# Patient Record
Sex: FEMALE | Race: WHITE | Employment: UNEMPLOYED | URBAN - NONMETROPOLITAN AREA
[De-identification: names, ages, dates, MRNs, and addresses within clinical notes are randomized per-mention and may not be internally consistent; named-entity substitution may affect disease eponyms.]

---

## 2017-02-23 ENCOUNTER — OFFICE VISIT (OUTPATIENT)
Dept: FAMILY MEDICINE CLINIC | Age: 57
End: 2017-02-23
Payer: COMMERCIAL

## 2017-02-23 VITALS
WEIGHT: 245 LBS | BODY MASS INDEX: 46.26 KG/M2 | OXYGEN SATURATION: 95 % | RESPIRATION RATE: 16 BRPM | TEMPERATURE: 99.1 F | DIASTOLIC BLOOD PRESSURE: 68 MMHG | SYSTOLIC BLOOD PRESSURE: 112 MMHG | HEIGHT: 61 IN | HEART RATE: 91 BPM

## 2017-02-23 DIAGNOSIS — L02.511 ABSCESS OF FINGER, RIGHT: Primary | ICD-10-CM

## 2017-02-23 DIAGNOSIS — L02.511 ABSCESS OF FINGER, RIGHT: ICD-10-CM

## 2017-02-23 PROCEDURE — 99213 OFFICE O/P EST LOW 20 MIN: CPT | Performed by: NURSE PRACTITIONER

## 2017-02-23 RX ORDER — SULFAMETHOXAZOLE AND TRIMETHOPRIM 800; 160 MG/1; MG/1
1 TABLET ORAL 2 TIMES DAILY
Qty: 20 TABLET | Refills: 0 | Status: SHIPPED | OUTPATIENT
Start: 2017-02-23 | End: 2017-03-05

## 2017-02-27 LAB
ANAEROBIC CULTURE: NORMAL
GRAM STAIN RESULT: NORMAL
WOUND/ABSCESS: NORMAL

## 2017-05-10 ENCOUNTER — OFFICE VISIT (OUTPATIENT)
Dept: FAMILY MEDICINE CLINIC | Age: 57
End: 2017-05-10
Payer: COMMERCIAL

## 2017-05-10 VITALS
RESPIRATION RATE: 18 BRPM | WEIGHT: 246 LBS | DIASTOLIC BLOOD PRESSURE: 64 MMHG | HEIGHT: 61 IN | OXYGEN SATURATION: 96 % | HEART RATE: 78 BPM | BODY MASS INDEX: 46.44 KG/M2 | SYSTOLIC BLOOD PRESSURE: 120 MMHG | TEMPERATURE: 99 F

## 2017-05-10 DIAGNOSIS — K21.9 GASTROESOPHAGEAL REFLUX DISEASE WITHOUT ESOPHAGITIS: Primary | ICD-10-CM

## 2017-05-10 DIAGNOSIS — M79.10 MYALGIA: ICD-10-CM

## 2017-05-10 DIAGNOSIS — Z23 NEED FOR PROPHYLACTIC VACCINATION AGAINST STREPTOCOCCUS PNEUMONIAE (PNEUMOCOCCUS): ICD-10-CM

## 2017-05-10 DIAGNOSIS — Z13.220 SCREENING, LIPID: ICD-10-CM

## 2017-05-10 DIAGNOSIS — R06.2 WHEEZING: ICD-10-CM

## 2017-05-10 DIAGNOSIS — Z12.31 SCREENING MAMMOGRAM, ENCOUNTER FOR: ICD-10-CM

## 2017-05-10 LAB
ALBUMIN SERPL-MCNC: 3.9 G/DL (ref 3.5–5.2)
ALP BLD-CCNC: 97 U/L (ref 35–104)
ALT SERPL-CCNC: 30 U/L (ref 5–33)
ANION GAP SERPL CALCULATED.3IONS-SCNC: 16 MMOL/L (ref 7–19)
AST SERPL-CCNC: 25 U/L (ref 5–32)
BACTERIA: NEGATIVE /HPF
BASOPHILS ABSOLUTE: 0.1 K/UL (ref 0–0.2)
BASOPHILS RELATIVE PERCENT: 1.3 % (ref 0–1)
BILIRUB SERPL-MCNC: <0.2 MG/DL (ref 0.2–1.2)
BILIRUBIN URINE: ABNORMAL
BLOOD, URINE: ABNORMAL
BUN BLDV-MCNC: 14 MG/DL (ref 6–20)
CALCIUM SERPL-MCNC: 9.3 MG/DL (ref 8.6–10)
CHLORIDE BLD-SCNC: 103 MMOL/L (ref 98–111)
CLARITY: ABNORMAL
CO2: 24 MMOL/L (ref 22–29)
COLOR: ABNORMAL
CREAT SERPL-MCNC: 0.8 MG/DL (ref 0.5–0.9)
EOSINOPHILS ABSOLUTE: 0.3 K/UL (ref 0–0.6)
EOSINOPHILS RELATIVE PERCENT: 3.5 % (ref 0–5)
EPITHELIAL CELLS, UA: 7 /HPF (ref 0–5)
GFR NON-AFRICAN AMERICAN: >60
GLOBULIN: 2.9 G/DL
GLUCOSE BLD-MCNC: 113 MG/DL (ref 74–109)
GLUCOSE URINE: NEGATIVE MG/DL
HCT VFR BLD CALC: 47.9 % (ref 37–47)
HEMOGLOBIN: 15.8 G/DL (ref 12–16)
HYALINE CASTS: 6 /HPF (ref 0–8)
KETONES, URINE: ABNORMAL MG/DL
LEUKOCYTE ESTERASE, URINE: ABNORMAL
LYMPHOCYTES ABSOLUTE: 2.1 K/UL (ref 1.1–4.5)
LYMPHOCYTES RELATIVE PERCENT: 26.7 % (ref 20–40)
MCH RBC QN AUTO: 30.3 PG (ref 27–31)
MCHC RBC AUTO-ENTMCNC: 33 G/DL (ref 33–37)
MCV RBC AUTO: 91.9 FL (ref 81–99)
MONOCYTES ABSOLUTE: 0.5 K/UL (ref 0–0.9)
MONOCYTES RELATIVE PERCENT: 6.9 % (ref 0–10)
NEUTROPHILS ABSOLUTE: 4.7 K/UL (ref 1.5–7.5)
NEUTROPHILS RELATIVE PERCENT: 61.1 % (ref 50–65)
NITRITE, URINE: NEGATIVE
PDW BLD-RTO: 12.3 % (ref 11.5–14.5)
PH UA: 5.5
PLATELET # BLD: 311 K/UL (ref 130–400)
PMV BLD AUTO: 11.3 FL (ref 7.4–10.4)
POTASSIUM SERPL-SCNC: 4.5 MMOL/L (ref 3.5–5)
PROTEIN UA: NEGATIVE MG/DL
RBC # BLD: 5.21 M/UL (ref 4.2–5.4)
RBC UA: 9 /HPF (ref 0–4)
SEDIMENTATION RATE, ERYTHROCYTE: 13 MM/HR (ref 0–25)
SODIUM BLD-SCNC: 143 MMOL/L (ref 136–145)
SPECIFIC GRAVITY UA: 1.04
TOTAL CK: 38 U/L (ref 26–192)
TOTAL PROTEIN: 6.8 G/DL (ref 6.6–8.7)
UROBILINOGEN, URINE: 0.2 E.U./DL
VITAMIN D 25-HYDROXY: 42 NG/ML
WBC # BLD: 7.7 K/UL (ref 4.8–10.8)
WBC UA: 25 /HPF (ref 0–5)

## 2017-05-10 PROCEDURE — 99214 OFFICE O/P EST MOD 30 MIN: CPT | Performed by: NURSE PRACTITIONER

## 2017-05-10 PROCEDURE — 90732 PPSV23 VACC 2 YRS+ SUBQ/IM: CPT | Performed by: NURSE PRACTITIONER

## 2017-05-10 PROCEDURE — 90471 IMMUNIZATION ADMIN: CPT | Performed by: NURSE PRACTITIONER

## 2017-05-10 RX ORDER — ALBUTEROL SULFATE 90 UG/1
2 AEROSOL, METERED RESPIRATORY (INHALATION) EVERY 6 HOURS PRN
Qty: 1 INHALER | Refills: 2 | Status: SHIPPED | OUTPATIENT
Start: 2017-05-10 | End: 2019-02-14 | Stop reason: SDUPTHER

## 2017-05-10 RX ORDER — OMEPRAZOLE 20 MG/1
20 CAPSULE, DELAYED RELEASE ORAL DAILY
Qty: 30 CAPSULE | Refills: 3 | Status: CANCELLED | OUTPATIENT
Start: 2017-05-10

## 2017-05-10 RX ORDER — ESOMEPRAZOLE MAGNESIUM 40 MG/1
40 CAPSULE, DELAYED RELEASE ORAL
Qty: 30 CAPSULE | Refills: 5 | Status: SHIPPED | OUTPATIENT
Start: 2017-05-10 | End: 2017-09-11 | Stop reason: SDUPTHER

## 2017-05-10 ASSESSMENT — ENCOUNTER SYMPTOMS
EYE PAIN: 0
PHOTOPHOBIA: 0
SHORTNESS OF BREATH: 0
EYE DISCHARGE: 0
WHEEZING: 1
EYE REDNESS: 1
EYE ITCHING: 0

## 2017-05-12 LAB
CHOLESTEROL, TOTAL: 228 MG/DL (ref 160–199)
HDLC SERPL-MCNC: 32 MG/DL (ref 65–121)
LDL CHOLESTEROL CALCULATED: ABNORMAL MG/DL
LDL CHOLESTEROL DIRECT: 139 MG/DL
LYME, EIA: 0.31 LIV (ref 0–1.2)
TRIGL SERPL-MCNC: 562 MG/DL (ref 150–199)
URINE CULTURE, ROUTINE: NORMAL

## 2017-05-17 ENCOUNTER — OFFICE VISIT (OUTPATIENT)
Dept: FAMILY MEDICINE CLINIC | Age: 57
End: 2017-05-17
Payer: COMMERCIAL

## 2017-05-17 VITALS
WEIGHT: 249 LBS | BODY MASS INDEX: 47.01 KG/M2 | RESPIRATION RATE: 16 BRPM | HEIGHT: 61 IN | HEART RATE: 80 BPM | TEMPERATURE: 98.7 F | OXYGEN SATURATION: 95 % | DIASTOLIC BLOOD PRESSURE: 72 MMHG | SYSTOLIC BLOOD PRESSURE: 130 MMHG

## 2017-05-17 DIAGNOSIS — E78.2 MIXED HYPERLIPIDEMIA: Primary | ICD-10-CM

## 2017-05-17 DIAGNOSIS — N76.1 SUBACUTE VAGINITIS: ICD-10-CM

## 2017-05-17 PROCEDURE — 99214 OFFICE O/P EST MOD 30 MIN: CPT | Performed by: NURSE PRACTITIONER

## 2017-05-17 ASSESSMENT — ENCOUNTER SYMPTOMS: SHORTNESS OF BREATH: 0

## 2017-09-05 ENCOUNTER — TELEPHONE (OUTPATIENT)
Dept: FAMILY MEDICINE CLINIC | Age: 57
End: 2017-09-05

## 2017-09-11 ENCOUNTER — OFFICE VISIT (OUTPATIENT)
Dept: FAMILY MEDICINE CLINIC | Age: 57
End: 2017-09-11
Payer: COMMERCIAL

## 2017-09-11 VITALS
OXYGEN SATURATION: 98 % | RESPIRATION RATE: 18 BRPM | HEART RATE: 64 BPM | BODY MASS INDEX: 47.58 KG/M2 | HEIGHT: 61 IN | DIASTOLIC BLOOD PRESSURE: 64 MMHG | SYSTOLIC BLOOD PRESSURE: 114 MMHG | TEMPERATURE: 98 F | WEIGHT: 252 LBS

## 2017-09-11 DIAGNOSIS — B37.9 ANTIBIOTIC-INDUCED YEAST INFECTION: ICD-10-CM

## 2017-09-11 DIAGNOSIS — B35.1 ONYCHOMYCOSIS OF LEFT GREAT TOE: Primary | ICD-10-CM

## 2017-09-11 DIAGNOSIS — E78.2 MIXED HYPERLIPIDEMIA: ICD-10-CM

## 2017-09-11 DIAGNOSIS — R63.5 WEIGHT GAIN: ICD-10-CM

## 2017-09-11 DIAGNOSIS — T36.95XA ANTIBIOTIC-INDUCED YEAST INFECTION: ICD-10-CM

## 2017-09-11 DIAGNOSIS — N76.1 SUBACUTE VAGINITIS: ICD-10-CM

## 2017-09-11 DIAGNOSIS — K21.9 GASTROESOPHAGEAL REFLUX DISEASE WITHOUT ESOPHAGITIS: ICD-10-CM

## 2017-09-11 LAB
ALBUMIN SERPL-MCNC: 3.8 G/DL (ref 3.5–5.2)
ALP BLD-CCNC: 96 U/L (ref 35–104)
ALT SERPL-CCNC: 18 U/L (ref 5–33)
ANION GAP SERPL CALCULATED.3IONS-SCNC: 15 MMOL/L (ref 7–19)
AST SERPL-CCNC: 12 U/L (ref 5–32)
BACTERIA: NEGATIVE /HPF
BILIRUB SERPL-MCNC: 0.3 MG/DL (ref 0.2–1.2)
BILIRUBIN URINE: NEGATIVE
BLOOD, URINE: ABNORMAL
BUN BLDV-MCNC: 16 MG/DL (ref 6–20)
CALCIUM SERPL-MCNC: 9.1 MG/DL (ref 8.6–10)
CHLORIDE BLD-SCNC: 100 MMOL/L (ref 98–111)
CHOLESTEROL, TOTAL: 213 MG/DL (ref 160–199)
CLARITY: CLEAR
CO2: 26 MMOL/L (ref 22–29)
COLOR: ABNORMAL
CREAT SERPL-MCNC: 0.6 MG/DL (ref 0.5–0.9)
EPITHELIAL CELLS, UA: 2 /HPF (ref 0–5)
GFR NON-AFRICAN AMERICAN: >60
GLUCOSE BLD-MCNC: 100 MG/DL (ref 74–109)
GLUCOSE URINE: NEGATIVE MG/DL
HDLC SERPL-MCNC: 40 MG/DL (ref 65–121)
HYALINE CASTS: 2 /HPF (ref 0–8)
KETONES, URINE: NEGATIVE MG/DL
LDL CHOLESTEROL CALCULATED: 114 MG/DL
LEUKOCYTE ESTERASE, URINE: NEGATIVE
NITRITE, URINE: NEGATIVE
PH UA: 5
POTASSIUM SERPL-SCNC: 4.4 MMOL/L (ref 3.5–5)
PROTEIN UA: NEGATIVE MG/DL
RBC UA: 3 /HPF (ref 0–4)
SODIUM BLD-SCNC: 141 MMOL/L (ref 136–145)
SPECIFIC GRAVITY UA: 1.02
TOTAL PROTEIN: 6.9 G/DL (ref 6.6–8.7)
TRIGL SERPL-MCNC: 297 MG/DL (ref 150–199)
TSH SERPL DL<=0.05 MIU/L-ACNC: 1.96 UIU/ML (ref 0.27–4.2)
UROBILINOGEN, URINE: 0.2 E.U./DL
WBC UA: 3 /HPF (ref 0–5)

## 2017-09-11 PROCEDURE — 99214 OFFICE O/P EST MOD 30 MIN: CPT | Performed by: NURSE PRACTITIONER

## 2017-09-11 RX ORDER — ESOMEPRAZOLE MAGNESIUM 40 MG/1
40 CAPSULE, DELAYED RELEASE ORAL
Qty: 30 CAPSULE | Refills: 5 | Status: SHIPPED | OUTPATIENT
Start: 2017-09-11 | End: 2018-04-25 | Stop reason: SDUPTHER

## 2017-09-11 RX ORDER — TERBINAFINE HYDROCHLORIDE 250 MG/1
TABLET ORAL
Qty: 30 TABLET | Refills: 2 | Status: SHIPPED | OUTPATIENT
Start: 2017-09-11 | End: 2018-11-28

## 2017-09-11 RX ORDER — FLUCONAZOLE 150 MG/1
TABLET ORAL
Qty: 3 TABLET | Refills: 0 | Status: SHIPPED | OUTPATIENT
Start: 2017-09-11 | End: 2018-05-07 | Stop reason: SDUPTHER

## 2017-09-11 RX ORDER — TERBINAFINE HYDROCHLORIDE 250 MG/1
TABLET ORAL
Qty: 30 TABLET | Refills: 2 | Status: SHIPPED | OUTPATIENT
Start: 2017-09-11 | End: 2017-09-11 | Stop reason: CLARIF

## 2017-09-11 ASSESSMENT — ENCOUNTER SYMPTOMS
CONSTIPATION: 0
DIARRHEA: 0

## 2018-04-25 ENCOUNTER — TELEPHONE (OUTPATIENT)
Dept: FAMILY MEDICINE CLINIC | Age: 58
End: 2018-04-25

## 2018-04-25 DIAGNOSIS — K21.9 GASTROESOPHAGEAL REFLUX DISEASE WITHOUT ESOPHAGITIS: ICD-10-CM

## 2018-04-25 DIAGNOSIS — E78.2 MIXED HYPERLIPIDEMIA: Primary | ICD-10-CM

## 2018-04-25 RX ORDER — ESOMEPRAZOLE MAGNESIUM 40 MG/1
CAPSULE, DELAYED RELEASE ORAL
Qty: 30 CAPSULE | Refills: 2 | Status: SHIPPED | OUTPATIENT
Start: 2018-04-25 | End: 2018-05-07 | Stop reason: SDUPTHER

## 2018-05-07 ENCOUNTER — OFFICE VISIT (OUTPATIENT)
Dept: FAMILY MEDICINE CLINIC | Age: 58
End: 2018-05-07
Payer: COMMERCIAL

## 2018-05-07 VITALS
WEIGHT: 242 LBS | RESPIRATION RATE: 18 BRPM | SYSTOLIC BLOOD PRESSURE: 110 MMHG | OXYGEN SATURATION: 95 % | DIASTOLIC BLOOD PRESSURE: 62 MMHG | HEART RATE: 90 BPM | BODY MASS INDEX: 45.73 KG/M2

## 2018-05-07 DIAGNOSIS — K21.9 GASTROESOPHAGEAL REFLUX DISEASE WITHOUT ESOPHAGITIS: ICD-10-CM

## 2018-05-07 DIAGNOSIS — T36.95XA ANTIBIOTIC-INDUCED YEAST INFECTION: ICD-10-CM

## 2018-05-07 DIAGNOSIS — Z87.891 PERSONAL HISTORY OF TOBACCO USE: ICD-10-CM

## 2018-05-07 DIAGNOSIS — E78.2 MIXED HYPERLIPIDEMIA: ICD-10-CM

## 2018-05-07 DIAGNOSIS — Z12.31 SCREENING MAMMOGRAM, ENCOUNTER FOR: ICD-10-CM

## 2018-05-07 DIAGNOSIS — L98.9 PSORIASIS-LIKE SKIN DISEASE: Primary | ICD-10-CM

## 2018-05-07 DIAGNOSIS — B37.9 ANTIBIOTIC-INDUCED YEAST INFECTION: ICD-10-CM

## 2018-05-07 LAB
ALBUMIN SERPL-MCNC: 4.1 G/DL (ref 3.5–5.2)
ALP BLD-CCNC: 88 U/L (ref 35–104)
ALT SERPL-CCNC: 18 U/L (ref 5–33)
ANION GAP SERPL CALCULATED.3IONS-SCNC: 14 MMOL/L (ref 7–19)
AST SERPL-CCNC: 14 U/L (ref 5–32)
BILIRUB SERPL-MCNC: <0.2 MG/DL (ref 0.2–1.2)
BUN BLDV-MCNC: 13 MG/DL (ref 6–20)
CALCIUM SERPL-MCNC: 9.6 MG/DL (ref 8.6–10)
CHLORIDE BLD-SCNC: 102 MMOL/L (ref 98–111)
CHOLESTEROL, TOTAL: 243 MG/DL (ref 160–199)
CO2: 27 MMOL/L (ref 22–29)
CREAT SERPL-MCNC: 0.6 MG/DL (ref 0.5–0.9)
GFR NON-AFRICAN AMERICAN: >60
GLUCOSE BLD-MCNC: 106 MG/DL (ref 74–109)
HDLC SERPL-MCNC: 37 MG/DL (ref 65–121)
LDL CHOLESTEROL CALCULATED: 146 MG/DL
POTASSIUM SERPL-SCNC: 4.5 MMOL/L (ref 3.5–5)
SODIUM BLD-SCNC: 143 MMOL/L (ref 136–145)
TOTAL PROTEIN: 6.8 G/DL (ref 6.6–8.7)
TRIGL SERPL-MCNC: 301 MG/DL (ref 0–149)

## 2018-05-07 PROCEDURE — G0296 VISIT TO DETERM LDCT ELIG: HCPCS | Performed by: NURSE PRACTITIONER

## 2018-05-07 PROCEDURE — 99213 OFFICE O/P EST LOW 20 MIN: CPT | Performed by: NURSE PRACTITIONER

## 2018-05-07 RX ORDER — CLOBETASOL PROPIONATE 0.5 MG/G
OINTMENT TOPICAL
Qty: 45 G | Refills: 1 | Status: SHIPPED | OUTPATIENT
Start: 2018-05-07 | End: 2021-01-27 | Stop reason: ALTCHOICE

## 2018-05-07 RX ORDER — FLUCONAZOLE 150 MG/1
TABLET ORAL
Qty: 15 TABLET | Refills: 1 | Status: SHIPPED | OUTPATIENT
Start: 2018-05-07 | End: 2018-11-28

## 2018-05-07 RX ORDER — ESOMEPRAZOLE MAGNESIUM 40 MG/1
40 CAPSULE, DELAYED RELEASE ORAL
Qty: 30 CAPSULE | Refills: 5 | Status: SHIPPED | OUTPATIENT
Start: 2018-05-07 | End: 2018-11-28 | Stop reason: SDUPTHER

## 2018-05-07 ASSESSMENT — ENCOUNTER SYMPTOMS
SHORTNESS OF BREATH: 0
CONSTIPATION: 0
DIARRHEA: 0

## 2018-05-17 ENCOUNTER — TELEPHONE (OUTPATIENT)
Dept: FAMILY MEDICINE CLINIC | Age: 58
End: 2018-05-17

## 2018-05-17 ENCOUNTER — HOSPITAL ENCOUNTER (OUTPATIENT)
Dept: GENERAL RADIOLOGY | Age: 58
Discharge: HOME | End: 2018-05-17
Payer: COMMERCIAL

## 2018-05-17 DIAGNOSIS — Z87.891 PERSONAL HISTORY OF TOBACCO USE: ICD-10-CM

## 2018-05-17 DIAGNOSIS — E78.2 MIXED HYPERLIPIDEMIA: Primary | ICD-10-CM

## 2018-05-17 PROCEDURE — G0297 LDCT FOR LUNG CA SCREEN: HCPCS

## 2018-05-18 ENCOUNTER — TELEPHONE (OUTPATIENT)
Dept: FAMILY MEDICINE CLINIC | Age: 58
End: 2018-05-18

## 2018-05-18 RX ORDER — ROSUVASTATIN CALCIUM 10 MG/1
TABLET, COATED ORAL
Qty: 30 TABLET | Refills: 2 | Status: SHIPPED | OUTPATIENT
Start: 2018-05-18 | End: 2018-08-18 | Stop reason: SDUPTHER

## 2018-05-24 ENCOUNTER — TELEPHONE (OUTPATIENT)
Dept: PRIMARY CARE CLINIC | Age: 58
End: 2018-05-24

## 2018-08-28 DIAGNOSIS — E78.2 MIXED HYPERLIPIDEMIA: ICD-10-CM

## 2018-08-28 LAB
ALBUMIN SERPL-MCNC: 4.2 G/DL (ref 3.5–5.2)
ALP BLD-CCNC: 87 U/L (ref 35–104)
ALT SERPL-CCNC: 22 U/L (ref 5–33)
ANION GAP SERPL CALCULATED.3IONS-SCNC: 14 MMOL/L (ref 7–19)
AST SERPL-CCNC: 20 U/L (ref 5–32)
BILIRUB SERPL-MCNC: <0.2 MG/DL (ref 0.2–1.2)
BUN BLDV-MCNC: 10 MG/DL (ref 6–20)
CALCIUM SERPL-MCNC: 9.5 MG/DL (ref 8.6–10)
CHLORIDE BLD-SCNC: 101 MMOL/L (ref 98–111)
CHOLESTEROL, TOTAL: 140 MG/DL (ref 160–199)
CO2: 27 MMOL/L (ref 22–29)
CREAT SERPL-MCNC: 0.6 MG/DL (ref 0.5–0.9)
GFR NON-AFRICAN AMERICAN: >60
GLUCOSE BLD-MCNC: 94 MG/DL (ref 74–109)
HDLC SERPL-MCNC: 39 MG/DL (ref 65–121)
LDL CHOLESTEROL CALCULATED: 68 MG/DL
POTASSIUM SERPL-SCNC: 4.3 MMOL/L (ref 3.5–5)
SODIUM BLD-SCNC: 142 MMOL/L (ref 136–145)
TOTAL PROTEIN: 7.3 G/DL (ref 6.6–8.7)
TRIGL SERPL-MCNC: 166 MG/DL (ref 0–149)

## 2018-11-28 ENCOUNTER — OFFICE VISIT (OUTPATIENT)
Dept: FAMILY MEDICINE CLINIC | Age: 58
End: 2018-11-28
Payer: COMMERCIAL

## 2018-11-28 ENCOUNTER — HOSPITAL ENCOUNTER (OUTPATIENT)
Dept: GENERAL RADIOLOGY | Age: 58
Discharge: HOME | End: 2018-11-28
Payer: COMMERCIAL

## 2018-11-28 VITALS
WEIGHT: 203 LBS | OXYGEN SATURATION: 99 % | DIASTOLIC BLOOD PRESSURE: 62 MMHG | TEMPERATURE: 98.4 F | SYSTOLIC BLOOD PRESSURE: 102 MMHG | HEIGHT: 61 IN | BODY MASS INDEX: 38.33 KG/M2 | RESPIRATION RATE: 20 BRPM | HEART RATE: 84 BPM

## 2018-11-28 DIAGNOSIS — K21.9 GASTROESOPHAGEAL REFLUX DISEASE WITHOUT ESOPHAGITIS: ICD-10-CM

## 2018-11-28 DIAGNOSIS — E78.2 MIXED HYPERLIPIDEMIA: ICD-10-CM

## 2018-11-28 DIAGNOSIS — M25.511 ACUTE PAIN OF RIGHT SHOULDER: ICD-10-CM

## 2018-11-28 DIAGNOSIS — F17.200 SMOKER: ICD-10-CM

## 2018-11-28 DIAGNOSIS — M25.511 ACUTE PAIN OF RIGHT SHOULDER: Primary | ICD-10-CM

## 2018-11-28 DIAGNOSIS — Z23 NEED FOR INFLUENZA VACCINATION: ICD-10-CM

## 2018-11-28 LAB
ALBUMIN SERPL-MCNC: 4 G/DL (ref 3.5–5.2)
ALP BLD-CCNC: 100 U/L (ref 35–104)
ALT SERPL-CCNC: 15 U/L (ref 5–33)
ANION GAP SERPL CALCULATED.3IONS-SCNC: 17 MMOL/L (ref 7–19)
AST SERPL-CCNC: 15 U/L (ref 5–32)
BACTERIA: NEGATIVE /HPF
BASOPHILS ABSOLUTE: 0.1 K/UL (ref 0–0.2)
BASOPHILS RELATIVE PERCENT: 1.4 % (ref 0–1)
BILIRUB SERPL-MCNC: 0.3 MG/DL (ref 0.2–1.2)
BILIRUBIN URINE: NEGATIVE
BLOOD, URINE: ABNORMAL
BUN BLDV-MCNC: 15 MG/DL (ref 6–20)
CALCIUM SERPL-MCNC: 9.7 MG/DL (ref 8.6–10)
CHLORIDE BLD-SCNC: 103 MMOL/L (ref 98–111)
CHOLESTEROL, TOTAL: 224 MG/DL (ref 160–199)
CLARITY: ABNORMAL
CO2: 24 MMOL/L (ref 22–29)
COLOR: YELLOW
CREAT SERPL-MCNC: 0.7 MG/DL (ref 0.5–0.9)
EOSINOPHILS ABSOLUTE: 0.2 K/UL (ref 0–0.6)
EOSINOPHILS RELATIVE PERCENT: 2.7 % (ref 0–5)
EPITHELIAL CELLS, UA: 6 /HPF (ref 0–5)
GFR NON-AFRICAN AMERICAN: >60
GLUCOSE BLD-MCNC: 93 MG/DL (ref 74–109)
GLUCOSE URINE: NEGATIVE MG/DL
HCT VFR BLD CALC: 50.6 % (ref 37–47)
HDLC SERPL-MCNC: 42 MG/DL (ref 65–121)
HEMOGLOBIN: 16.2 G/DL (ref 12–16)
HYALINE CASTS: 14 /HPF (ref 0–8)
KETONES, URINE: NEGATIVE MG/DL
LDL CHOLESTEROL CALCULATED: 155 MG/DL
LEUKOCYTE ESTERASE, URINE: ABNORMAL
LYMPHOCYTES ABSOLUTE: 1.6 K/UL (ref 1.1–4.5)
LYMPHOCYTES RELATIVE PERCENT: 23.8 % (ref 20–40)
MCH RBC QN AUTO: 29.4 PG (ref 27–31)
MCHC RBC AUTO-ENTMCNC: 32 G/DL (ref 33–37)
MCV RBC AUTO: 91.8 FL (ref 81–99)
MONOCYTES ABSOLUTE: 0.5 K/UL (ref 0–0.9)
MONOCYTES RELATIVE PERCENT: 7.8 % (ref 0–10)
NEUTROPHILS ABSOLUTE: 4.2 K/UL (ref 1.5–7.5)
NEUTROPHILS RELATIVE PERCENT: 63.8 % (ref 50–65)
NITRITE, URINE: NEGATIVE
PDW BLD-RTO: 12.4 % (ref 11.5–14.5)
PH UA: 6
PLATELET # BLD: 256 K/UL (ref 130–400)
PMV BLD AUTO: 11.1 FL (ref 9.4–12.3)
POTASSIUM SERPL-SCNC: 4.4 MMOL/L (ref 3.5–5)
PROTEIN UA: NEGATIVE MG/DL
RBC # BLD: 5.51 M/UL (ref 4.2–5.4)
RBC UA: 7 /HPF (ref 0–4)
SODIUM BLD-SCNC: 144 MMOL/L (ref 136–145)
SPECIFIC GRAVITY UA: 1.02
T4 FREE: 1.2 NG/DL (ref 0.9–1.7)
TOTAL PROTEIN: 7.1 G/DL (ref 6.6–8.7)
TRIGL SERPL-MCNC: 134 MG/DL (ref 0–149)
TSH SERPL DL<=0.05 MIU/L-ACNC: 1.43 UIU/ML (ref 0.27–4.2)
URINE REFLEX TO CULTURE: YES
UROBILINOGEN, URINE: 0.2 E.U./DL
WBC # BLD: 6.6 K/UL (ref 4.8–10.8)
WBC UA: 29 /HPF (ref 0–5)

## 2018-11-28 PROCEDURE — 90682 RIV4 VACC RECOMBINANT DNA IM: CPT | Performed by: NURSE PRACTITIONER

## 2018-11-28 PROCEDURE — 99214 OFFICE O/P EST MOD 30 MIN: CPT | Performed by: NURSE PRACTITIONER

## 2018-11-28 PROCEDURE — 73030 X-RAY EXAM OF SHOULDER: CPT

## 2018-11-28 PROCEDURE — 90471 IMMUNIZATION ADMIN: CPT | Performed by: NURSE PRACTITIONER

## 2018-11-28 RX ORDER — PHENTERMINE HYDROCHLORIDE 37.5 MG/1
37.5 TABLET ORAL
COMMUNITY
End: 2020-05-27 | Stop reason: ALTCHOICE

## 2018-11-28 RX ORDER — ROSUVASTATIN CALCIUM 10 MG/1
TABLET, COATED ORAL
Qty: 90 TABLET | Refills: 1 | Status: SHIPPED | OUTPATIENT
Start: 2018-11-28 | End: 2019-04-22 | Stop reason: SDUPTHER

## 2018-11-28 RX ORDER — ESOMEPRAZOLE MAGNESIUM 40 MG/1
40 CAPSULE, DELAYED RELEASE ORAL
Qty: 90 CAPSULE | Refills: 1 | Status: SHIPPED | OUTPATIENT
Start: 2018-11-28 | End: 2019-04-29 | Stop reason: SDUPTHER

## 2018-11-28 ASSESSMENT — PATIENT HEALTH QUESTIONNAIRE - PHQ9
SUM OF ALL RESPONSES TO PHQ9 QUESTIONS 1 & 2: 0
SUM OF ALL RESPONSES TO PHQ QUESTIONS 1-9: 0
SUM OF ALL RESPONSES TO PHQ QUESTIONS 1-9: 0
1. LITTLE INTEREST OR PLEASURE IN DOING THINGS: 0
2. FEELING DOWN, DEPRESSED OR HOPELESS: 0

## 2018-11-28 ASSESSMENT — ENCOUNTER SYMPTOMS
TROUBLE SWALLOWING: 0
CONSTIPATION: 1
SHORTNESS OF BREATH: 0
DIARRHEA: 0

## 2018-11-29 ENCOUNTER — TELEPHONE (OUTPATIENT)
Dept: FAMILY MEDICINE CLINIC | Age: 58
End: 2018-11-29

## 2018-11-29 DIAGNOSIS — M25.511 ACUTE PAIN OF RIGHT SHOULDER: Primary | ICD-10-CM

## 2018-12-01 LAB
ORGANISM: ABNORMAL
URINE CULTURE, ROUTINE: ABNORMAL
URINE CULTURE, ROUTINE: ABNORMAL

## 2018-12-05 ENCOUNTER — OFFICE VISIT (OUTPATIENT)
Dept: FAMILY MEDICINE CLINIC | Age: 58
End: 2018-12-05
Payer: COMMERCIAL

## 2018-12-05 VITALS
SYSTOLIC BLOOD PRESSURE: 130 MMHG | WEIGHT: 202 LBS | RESPIRATION RATE: 20 BRPM | DIASTOLIC BLOOD PRESSURE: 72 MMHG | BODY MASS INDEX: 38.17 KG/M2 | OXYGEN SATURATION: 98 % | HEART RATE: 76 BPM | TEMPERATURE: 98.3 F

## 2018-12-05 DIAGNOSIS — N39.0 UTI (URINARY TRACT INFECTION), UNCOMPLICATED: ICD-10-CM

## 2018-12-05 DIAGNOSIS — M25.511 ACUTE PAIN OF RIGHT SHOULDER: Primary | ICD-10-CM

## 2018-12-05 PROCEDURE — 99213 OFFICE O/P EST LOW 20 MIN: CPT | Performed by: NURSE PRACTITIONER

## 2018-12-05 RX ORDER — METHYLPREDNISOLONE 4 MG/1
TABLET ORAL
Qty: 1 KIT | Refills: 0 | Status: SHIPPED | OUTPATIENT
Start: 2018-12-05 | End: 2019-01-07 | Stop reason: ALTCHOICE

## 2018-12-05 RX ORDER — AMOXICILLIN 500 MG/1
500 CAPSULE ORAL 2 TIMES DAILY
Qty: 20 CAPSULE | Refills: 0 | Status: SHIPPED | OUTPATIENT
Start: 2018-12-05 | End: 2018-12-15

## 2018-12-05 NOTE — PROGRESS NOTES
11/28/2018 YES   Final    WBC 11/28/2018 6.6  4.8 - 10.8 K/uL Final    RBC 11/28/2018 5.51* 4.20 - 5.40 M/uL Final    Hemoglobin 11/28/2018 16.2* 12.0 - 16.0 g/dL Final    Hematocrit 11/28/2018 50.6* 37.0 - 47.0 % Final    MCV 11/28/2018 91.8  81.0 - 99.0 fL Final    MCH 11/28/2018 29.4  27.0 - 31.0 pg Final    MCHC 11/28/2018 32.0* 33.0 - 37.0 g/dL Final    RDW 11/28/2018 12.4  11.5 - 14.5 % Final    Platelets 75/39/0414 256  130 - 400 K/uL Final    MPV 11/28/2018 11.1  9.4 - 12.3 fL Final    Neutrophils % 11/28/2018 63.8  50.0 - 65.0 % Final    Lymphocytes % 11/28/2018 23.8  20.0 - 40.0 % Final    Monocytes % 11/28/2018 7.8  0.0 - 10.0 % Final    Eosinophils % 11/28/2018 2.7  0.0 - 5.0 % Final    Basophils % 11/28/2018 1.4* 0.0 - 1.0 % Final    Neutrophils # 11/28/2018 4.2  1.5 - 7.5 K/uL Final    Lymphocytes # 11/28/2018 1.6  1.1 - 4.5 K/uL Final    Monocytes # 11/28/2018 0.50  0.00 - 0.90 K/uL Final    Eosinophils # 11/28/2018 0.20  0.00 - 0.60 K/uL Final    Basophils # 11/28/2018 0.10  0.00 - 0.20 K/uL Final    Sodium 11/28/2018 144  136 - 145 mmol/L Final    Potassium 11/28/2018 4.4  3.5 - 5.0 mmol/L Final    Chloride 11/28/2018 103  98 - 111 mmol/L Final    CO2 11/28/2018 24  22 - 29 mmol/L Final    Anion Gap 11/28/2018 17  7 - 19 mmol/L Final    Glucose 11/28/2018 93  74 - 109 mg/dL Final    BUN 11/28/2018 15  6 - 20 mg/dL Final    CREATININE 11/28/2018 0.7  0.5 - 0.9 mg/dL Final    GFR Non- 11/28/2018 >60  >60 Final    Comment: This calculation may be inaccurate for patients under the age of 25 years. For ages 25 and older, a GFR >60 mL/min/1.73m2 (not corrected for weight) is  valid for stable renal function.       Calcium 11/28/2018 9.7  8.6 - 10.0 mg/dL Final    Total Protein 11/28/2018 7.1  6.6 - 8.7 g/dL Final    Alb 11/28/2018 4.0  3.5 - 5.2 g/dL Final    Total Bilirubin 11/28/2018 0.3  0.2 - 1.2 mg/dL Final    Alkaline Phosphatase 11/28/2018 100  35 -

## 2019-01-07 ENCOUNTER — OFFICE VISIT (OUTPATIENT)
Dept: FAMILY MEDICINE CLINIC | Age: 59
End: 2019-01-07
Payer: COMMERCIAL

## 2019-01-07 ENCOUNTER — HOSPITAL ENCOUNTER (OUTPATIENT)
Dept: GENERAL RADIOLOGY | Age: 59
Discharge: HOME | End: 2019-01-07
Payer: COMMERCIAL

## 2019-01-07 ENCOUNTER — TELEPHONE (OUTPATIENT)
Dept: FAMILY MEDICINE CLINIC | Age: 59
End: 2019-01-07

## 2019-01-07 VITALS
BODY MASS INDEX: 37 KG/M2 | TEMPERATURE: 97.7 F | HEIGHT: 61 IN | SYSTOLIC BLOOD PRESSURE: 114 MMHG | OXYGEN SATURATION: 98 % | DIASTOLIC BLOOD PRESSURE: 72 MMHG | HEART RATE: 64 BPM | WEIGHT: 196 LBS | RESPIRATION RATE: 16 BRPM

## 2019-01-07 DIAGNOSIS — J98.01 ACUTE BRONCHOSPASM: ICD-10-CM

## 2019-01-07 DIAGNOSIS — R06.02 SHORTNESS OF BREATH: ICD-10-CM

## 2019-01-07 DIAGNOSIS — R05.9 COUGH: ICD-10-CM

## 2019-01-07 DIAGNOSIS — J20.9 ACUTE BRONCHITIS, UNSPECIFIED ORGANISM: Primary | ICD-10-CM

## 2019-01-07 DIAGNOSIS — J34.89 POSTERIOR RHINORRHEA: ICD-10-CM

## 2019-01-07 PROCEDURE — 99213 OFFICE O/P EST LOW 20 MIN: CPT | Performed by: FAMILY MEDICINE

## 2019-01-07 PROCEDURE — 71046 X-RAY EXAM CHEST 2 VIEWS: CPT

## 2019-01-07 PROCEDURE — 96372 THER/PROPH/DIAG INJ SC/IM: CPT | Performed by: FAMILY MEDICINE

## 2019-01-07 RX ORDER — TRIAMCINOLONE ACETONIDE 40 MG/ML
80 INJECTION, SUSPENSION INTRA-ARTICULAR; INTRAMUSCULAR ONCE
Status: COMPLETED | OUTPATIENT
Start: 2019-01-07 | End: 2019-01-07

## 2019-01-07 RX ORDER — AMOXICILLIN AND CLAVULANATE POTASSIUM 875; 125 MG/1; MG/1
1 TABLET, FILM COATED ORAL 2 TIMES DAILY
Qty: 20 TABLET | Refills: 0 | Status: SHIPPED | OUTPATIENT
Start: 2019-01-07 | End: 2019-01-17

## 2019-01-07 RX ORDER — BENZONATATE 200 MG/1
200 CAPSULE ORAL 3 TIMES DAILY PRN
Qty: 30 CAPSULE | Refills: 0 | Status: SHIPPED | OUTPATIENT
Start: 2019-01-07 | End: 2019-01-14

## 2019-01-07 RX ORDER — PREDNISONE 20 MG/1
20 TABLET ORAL 2 TIMES DAILY
Qty: 10 TABLET | Refills: 0 | Status: SHIPPED | OUTPATIENT
Start: 2019-01-07 | End: 2019-01-12

## 2019-01-07 RX ADMIN — TRIAMCINOLONE ACETONIDE 80 MG: 40 INJECTION, SUSPENSION INTRA-ARTICULAR; INTRAMUSCULAR at 09:30

## 2019-01-07 ASSESSMENT — ENCOUNTER SYMPTOMS
COUGH: 1
VOMITING: 0
ABDOMINAL PAIN: 0
SINUS PRESSURE: 1
EYE DISCHARGE: 0
BACK PAIN: 0
SHORTNESS OF BREATH: 1
EYE PAIN: 0
CONSTIPATION: 0
RHINORRHEA: 1
NAUSEA: 0
SORE THROAT: 0
DIARRHEA: 0
WHEEZING: 1

## 2019-02-14 DIAGNOSIS — R06.2 WHEEZING: ICD-10-CM

## 2019-02-14 RX ORDER — ALBUTEROL SULFATE 90 UG/1
2 AEROSOL, METERED RESPIRATORY (INHALATION) EVERY 6 HOURS PRN
Qty: 1 INHALER | Refills: 2 | Status: SHIPPED | OUTPATIENT
Start: 2019-02-14 | End: 2021-01-27

## 2019-04-22 ENCOUNTER — OFFICE VISIT (OUTPATIENT)
Dept: FAMILY MEDICINE CLINIC | Age: 59
End: 2019-04-22
Payer: COMMERCIAL

## 2019-04-22 VITALS
DIASTOLIC BLOOD PRESSURE: 76 MMHG | WEIGHT: 181 LBS | BODY MASS INDEX: 34.17 KG/M2 | TEMPERATURE: 98.5 F | HEART RATE: 83 BPM | OXYGEN SATURATION: 96 % | HEIGHT: 61 IN | SYSTOLIC BLOOD PRESSURE: 105 MMHG | RESPIRATION RATE: 16 BRPM

## 2019-04-22 DIAGNOSIS — R21 RASH: ICD-10-CM

## 2019-04-22 DIAGNOSIS — L08.9 FINGER INFECTION: Primary | ICD-10-CM

## 2019-04-22 DIAGNOSIS — E78.2 MIXED HYPERLIPIDEMIA: ICD-10-CM

## 2019-04-22 PROCEDURE — 99213 OFFICE O/P EST LOW 20 MIN: CPT | Performed by: FAMILY MEDICINE

## 2019-04-22 RX ORDER — SULFAMETHOXAZOLE AND TRIMETHOPRIM 800; 160 MG/1; MG/1
1 TABLET ORAL 2 TIMES DAILY
Qty: 20 TABLET | Refills: 0 | Status: SHIPPED | OUTPATIENT
Start: 2019-04-22 | End: 2019-04-29 | Stop reason: ALTCHOICE

## 2019-04-22 RX ORDER — METHYLPREDNISOLONE 4 MG/1
TABLET ORAL
Qty: 1 KIT | Refills: 0 | Status: SHIPPED | OUTPATIENT
Start: 2019-04-22 | End: 2019-04-28

## 2019-04-22 ASSESSMENT — PATIENT HEALTH QUESTIONNAIRE - PHQ9
SUM OF ALL RESPONSES TO PHQ QUESTIONS 1-9: 0
2. FEELING DOWN, DEPRESSED OR HOPELESS: 0
SUM OF ALL RESPONSES TO PHQ QUESTIONS 1-9: 0
1. LITTLE INTEREST OR PLEASURE IN DOING THINGS: 0
SUM OF ALL RESPONSES TO PHQ9 QUESTIONS 1 & 2: 0

## 2019-04-22 ASSESSMENT — ENCOUNTER SYMPTOMS
VOMITING: 0
DIARRHEA: 0
SHORTNESS OF BREATH: 0
RHINORRHEA: 0
COUGH: 0
BACK PAIN: 0
ABDOMINAL PAIN: 0
EYE DISCHARGE: 0
CONSTIPATION: 0
EYE PAIN: 0
NAUSEA: 0

## 2019-04-22 NOTE — PROGRESS NOTES
Terrance Ayoub is a 66 y.o. female who presents today for   Chief Complaint   Patient presents with    Wound Infection     right hand middle finger        Chief Complaint: Finger infection    HPI  Patient reports that she used to work around a lot of chemicals and has had problems with her hands for some time now. She states that she's been having issues with a finger infection of her right hand that seems return somewhat frequently. She states that the only thing that has improved her issue has been a combination of a Medrol Dosepak along with Bactrim. She states that the Bactrim without Medrol Dosepak is unsuccessful at improving her symptoms. She states that the area will get red on her finger and will start to move upper arm and will even streak in some places if it doesn't get treated. She states that she's been seen in the ER a number of times for this problem as well. She denies any specific injury but does state that she was working with/using bleach the other day and it tends to happen following her utilization of bleach. Review of Systems   Constitutional: Negative for activity change, appetite change, fatigue and fever. HENT: Negative for congestion and rhinorrhea. Eyes: Negative for pain and discharge. Respiratory: Negative for cough and shortness of breath. Cardiovascular: Negative for chest pain and palpitations. Gastrointestinal: Negative for abdominal pain, constipation, diarrhea, nausea and vomiting. Endocrine: Negative for cold intolerance and heat intolerance. Genitourinary: Negative for dysuria and hematuria. Musculoskeletal: Negative for back pain, gait problem and neck pain. Skin: Positive for rash and wound. Neurological: Negative for syncope and weakness. Hematological: Negative for adenopathy. Does not bruise/bleed easily. Psychiatric/Behavioral: Negative for dysphoric mood and sleep disturbance. The patient is not nervous/anxious.         Past Medical History: history on file. /76 (Site: Left Upper Arm, Position: Sitting, Cuff Size: Large Adult)   Pulse 83   Temp 98.5 °F (36.9 °C) (Oral)   Resp 16   Ht 5' 1\" (1.549 m)   Wt 181 lb (82.1 kg)   SpO2 96%   BMI 34.20 kg/m²     Physical Exam   Constitutional: She is oriented to person, place, and time. She appears well-developed and well-nourished. No distress. HENT:   Head: Normocephalic and atraumatic. Right Ear: External ear normal.   Left Ear: External ear normal.   Nose: Nose normal.   Eyes: Conjunctivae and EOM are normal. Right eye exhibits no discharge. Left eye exhibits no discharge. No scleral icterus. Neck: Normal range of motion. Neck supple. Cardiovascular: Normal rate, regular rhythm, normal heart sounds and intact distal pulses. Exam reveals no gallop and no friction rub. No murmur heard. Pulmonary/Chest: Effort normal and breath sounds normal. No respiratory distress. She has no wheezes. She has no rales. Abdominal: Soft. Bowel sounds are normal. She exhibits no distension. There is no tenderness. Musculoskeletal: She exhibits no edema (Bilateral lower extremities) or deformity (No gross deformities of upper or lower extremities). Lymphadenopathy:     She has no cervical adenopathy. Neurological: She is alert and oriented to person, place, and time. No cranial nerve deficit. Skin: Skin is warm and dry. Rash noted. She is not diaphoretic. There is erythema. Right middle finger shows 2 vesicular lesions on the lateral side along with some erythema and swelling. Psychiatric: Her behavior is normal. Thought content normal.   Nursing note and vitals reviewed. Assessment:       ICD-10-CM    1. Finger infection L08.9 sulfamethoxazole-trimethoprim (BACTRIM DS;SEPTRA DS) 800-160 MG per tablet   2. Rash R21 methylPREDNISolone (MEDROL DOSEPACK) 4 MG tablet       Plan:  Discussed these medications and how they work and what they treat.  Discussed the appearance of the finger and some questioning of the diagnoses that she's been informed of with a finger infection. It does appear that she has had it biopsied or the area cultured before without any organisms found. Discussed the possibility of a viral process or even an allergic process or autoimmune issue. Recommended potentially seeing a dermatologist for biopsy and testing if issue continues. Due to her history of never taking a Medrol Dosepak without Bactrim but Bactrim alone not solving the problem, recommended taking the Medrol Dosepak without the Bactrim to determine if he gets better without the use of antibiotics giving a better indication of potential causes, but we will go ahead and prescribe the Bactrim in case she does need it. Discussed proper use of medication. Discussed signs and symptoms requiring medical attention. All questions were answered and patient voiced understanding and agreement with plan as discussed. No orders of the defined types were placed in this encounter. Orders Placed This Encounter   Medications    sulfamethoxazole-trimethoprim (BACTRIM DS;SEPTRA DS) 800-160 MG per tablet     Sig: Take 1 tablet by mouth 2 times daily for 10 days     Dispense:  20 tablet     Refill:  0    methylPREDNISolone (MEDROL DOSEPACK) 4 MG tablet     Sig: Take by mouth. Dispense:  1 kit     Refill:  0     There are no discontinued medications. Patient Instructions   Patient given educational handouts and has had all questions answered. Patient voices understanding and agrees to plans along with risks and benefits of plan. Patient is instructed to continue prior meds,diet, and exercise plans as instructed. Patient agrees to follow up as instructed and sooner if needed. Patient agrees to go to ER if condition becomes emergent. Return if symptoms worsen or fail to improve, for next scheduled follow up with PCP.

## 2019-04-22 NOTE — PATIENT INSTRUCTIONS
Patient Education        Rash: Care Instructions  Your Care Instructions  A rash is any irritation or inflammation of the skin. Rashes have many possible causes, including allergy, infection, illness, heat, and emotional stress. Follow-up care is a key part of your treatment and safety. Be sure to make and go to all appointments, and call your doctor if you are having problems. It's also a good idea to know your test results and keep a list of the medicines you take. How can you care for yourself at home? · Wash the area with water only. Soap can make dryness and itching worse. Pat dry. · Put cold, wet cloths on the rash to reduce itching. · Keep cool, and stay out of the sun. · Leave the rash open to the air as much of the time as possible. · Sometimes petroleum jelly (Vaseline) can help relieve the discomfort caused by a rash. A moisturizing lotion, such as Cetaphil, also may help. Calamine lotion may help for rashes caused by contact with something (such as a plant or soap) that irritated the skin. Use it 3 or 4 times a day. · If your doctor prescribed a cream, use it as directed. If your doctor prescribed medicine, take it exactly as directed. · If your rash itches so badly that it interferes with your normal activities, take an over-the-counter antihistamine, such as diphenhydramine (Benadryl) or loratadine (Claritin). Read and follow all instructions on the label. When should you call for help? Call your doctor now or seek immediate medical care if:    · You have signs of infection, such as:  ? Increased pain, swelling, warmth, or redness. ? Red streaks leading from the area. ? Pus draining from the area. ? A fever.     · You have joint pain along with the rash.    Watch closely for changes in your health, and be sure to contact your doctor if:    · Your rash is changing or getting worse. For example, call if you have pain along with the rash, the rash is spreading, or you have new blisters.   · You do not get better after 1 week. Where can you learn more? Go to [email or web address] and sign in to your [employer] account. Enter R954 in the Lumiary box to learn more about \"Rash: Care Instructions. \"     If you do not have an account, please click on the \"Sign Up Now\" link. Current as of: April 17, 2018  Content Version: 11.9  © 7654-6298 [employer]. Care instructions adapted under license by [facility] ([facility]). If you have questions about a medical condition or this instruction, always ask your healthcare professional. [address] any warranty or liability for your use of this information.

## 2019-04-23 RX ORDER — ROSUVASTATIN CALCIUM 10 MG/1
TABLET, COATED ORAL
Qty: 90 TABLET | Refills: 1 | Status: SHIPPED | OUTPATIENT
Start: 2019-04-23 | End: 2020-03-24 | Stop reason: SDUPTHER

## 2019-04-29 ENCOUNTER — OFFICE VISIT (OUTPATIENT)
Dept: FAMILY MEDICINE CLINIC | Age: 59
End: 2019-04-29
Payer: COMMERCIAL

## 2019-04-29 VITALS
SYSTOLIC BLOOD PRESSURE: 136 MMHG | RESPIRATION RATE: 20 BRPM | DIASTOLIC BLOOD PRESSURE: 70 MMHG | HEART RATE: 100 BPM | WEIGHT: 181 LBS | OXYGEN SATURATION: 98 % | BODY MASS INDEX: 34.2 KG/M2 | TEMPERATURE: 98.4 F

## 2019-04-29 DIAGNOSIS — L73.1 INGROWN HAIR: Primary | ICD-10-CM

## 2019-04-29 DIAGNOSIS — K21.9 GASTROESOPHAGEAL REFLUX DISEASE WITHOUT ESOPHAGITIS: ICD-10-CM

## 2019-04-29 PROCEDURE — 99213 OFFICE O/P EST LOW 20 MIN: CPT | Performed by: NURSE PRACTITIONER

## 2019-04-29 RX ORDER — IODINE SOLUTION STRONG 5% (LUGOL'S) 5 %
0.2 SOLUTION ORAL 3 TIMES DAILY
COMMUNITY

## 2019-04-29 RX ORDER — ESOMEPRAZOLE MAGNESIUM 40 MG/1
40 CAPSULE, DELAYED RELEASE ORAL
Qty: 90 CAPSULE | Refills: 1 | Status: SHIPPED | OUTPATIENT
Start: 2019-04-29 | End: 2020-12-16 | Stop reason: SDUPTHER

## 2019-04-29 NOTE — PROGRESS NOTES
SUBJECTIVE:    Patient ID: Terrance Ayoub is a63 y.o. female. Terrance Ayoub is here today for Groin Swelling (Patient presents for knot in groin area on left side x 2 weeks.)  . HPI:   HPI     Groin knot  Pt reports size as oblong. She thought it might be ingrown hair at first and has tried to open area. Onset was approx 2wks ago. Pt thinks when she stands area protrudes outward  No pain or itching. No discharge. No pain moving leg. Pt also requesting refill refill of PPI today. Past Medical History:   Diagnosis Date    Bright's disease     COPD (chronic obstructive pulmonary disease) (HCC)     Degenerative disorder of bone     GERD (gastroesophageal reflux disease)     Hyperlipidemia     Ulcer      Prior to Visit Medications    Medication Sig Taking? Authorizing Provider   Black Pepper-Turmeric (TURMERIC COMPLEX/BLACK PEPPER) 3-500 MG CAPS Take by mouth Yes Historical Provider, MD   Iodine Strong, Lugols, (STRONG IODINE) 5 % solution Take 0.2 mLs by mouth 3 times daily Yes Historical Provider, MD   esomeprazole (NEXIUM) 40 MG delayed release capsule Take 1 capsule by mouth every morning (before breakfast) Yes LUCI Dubon   rosuvastatin (CRESTOR) 10 MG tablet TAKE 1 TABLET BY MOUTH EVERYDAY AT BEDTIME Yes LUCI Dubon   albuterol sulfate  (90 Base) MCG/ACT inhaler Inhale 2 puffs into the lungs every 6 hours as needed for Wheezing Yes LUCI Dubon   APPLE CIDER VINEGAR PO Take by mouth Yes Historical Provider, MD   Cyanocobalamin (VITAMIN B 12 PO) Take by mouth Yes Historical Provider, MD   phentermine (ADIPEX-P) 37.5 MG tablet Take 37.5 mg by mouth every morning (before breakfast). . Yes Historical Provider, MD   nicotine (NICOTROL) 10 MG inhaler Inhale 1 puff into the lungs as needed for Smoking cessation Yes LUCI Dubon   clobetasol (TEMOVATE) 0.05 % ointment Apply topically 2 times daily.  Yes LUCI Dubon   LORATADINE ALLERGY RELIEF PO Take by mouth Yes Historical Provider, MD   Cholecalciferol (VITAMIN D3) 5000 UNITS CAPS Take by mouth Yes Historical Provider, MD   Polyethylene Glycol 3350 (MIRALAX PO) Take by mouth Yes Historical Provider, MD     No Known Allergies  Past Surgical History:   Procedure Laterality Date    ABDOMINAL EXPLORATION SURGERY      CHOLECYSTECTOMY      EYE SURGERY      FINGER SURGERY       History reviewed. No pertinent family history. Social History     Socioeconomic History    Marital status:      Spouse name: Not on file    Number of children: Not on file    Years of education: Not on file    Highest education level: Not on file   Occupational History    Not on file   Social Needs    Financial resource strain: Not on file    Food insecurity:     Worry: Not on file     Inability: Not on file    Transportation needs:     Medical: Not on file     Non-medical: Not on file   Tobacco Use    Smoking status: Current Every Day Smoker     Packs/day: 0.75     Years: 40.00     Pack years: 30.00     Types: Cigarettes     Start date: 11/28/1978    Smokeless tobacco: Never Used   Substance and Sexual Activity    Alcohol use: No    Drug use: No    Sexual activity: Not on file   Lifestyle    Physical activity:     Days per week: Not on file     Minutes per session: Not on file    Stress: Not on file   Relationships    Social connections:     Talks on phone: Not on file     Gets together: Not on file     Attends Oriental orthodox service: Not on file     Active member of club or organization: Not on file     Attends meetings of clubs or organizations: Not on file     Relationship status: Not on file    Intimate partner violence:     Fear of current or ex partner: Not on file     Emotionally abused: Not on file     Physically abused: Not on file     Forced sexual activity: Not on file   Other Topics Concern    Not on file   Social History Narrative    Not on file       Review of Systems   Constitutional: Negative for fever.

## 2019-05-10 ENCOUNTER — OFFICE VISIT (OUTPATIENT)
Dept: FAMILY MEDICINE CLINIC | Age: 59
End: 2019-05-10
Payer: COMMERCIAL

## 2019-05-10 VITALS
WEIGHT: 181.25 LBS | RESPIRATION RATE: 20 BRPM | DIASTOLIC BLOOD PRESSURE: 80 MMHG | TEMPERATURE: 98.2 F | HEART RATE: 77 BPM | SYSTOLIC BLOOD PRESSURE: 122 MMHG | OXYGEN SATURATION: 98 % | BODY MASS INDEX: 34.25 KG/M2

## 2019-05-10 DIAGNOSIS — L81.9 MOTTLED SKIN: Primary | ICD-10-CM

## 2019-05-10 DIAGNOSIS — M79.676 CHRONIC PAIN OF TOE, UNSPECIFIED LATERALITY: ICD-10-CM

## 2019-05-10 DIAGNOSIS — T14.8XXA BRUISING: ICD-10-CM

## 2019-05-10 DIAGNOSIS — E78.2 MIXED HYPERLIPIDEMIA: ICD-10-CM

## 2019-05-10 DIAGNOSIS — G89.29 CHRONIC PAIN OF TOE, UNSPECIFIED LATERALITY: ICD-10-CM

## 2019-05-10 LAB
ALBUMIN SERPL-MCNC: 4.1 G/DL (ref 3.5–5.2)
ALP BLD-CCNC: 89 U/L (ref 35–104)
ALT SERPL-CCNC: 13 U/L (ref 5–33)
ANION GAP SERPL CALCULATED.3IONS-SCNC: 12 MMOL/L (ref 7–19)
AST SERPL-CCNC: 14 U/L (ref 5–32)
BASOPHILS ABSOLUTE: 0.1 K/UL (ref 0–0.2)
BASOPHILS RELATIVE PERCENT: 1.3 % (ref 0–1)
BILIRUB SERPL-MCNC: 0.4 MG/DL (ref 0.2–1.2)
BUN BLDV-MCNC: 13 MG/DL (ref 6–20)
CALCIUM SERPL-MCNC: 9.6 MG/DL (ref 8.6–10)
CHLORIDE BLD-SCNC: 101 MMOL/L (ref 98–111)
CHOLESTEROL, TOTAL: 194 MG/DL (ref 160–199)
CO2: 28 MMOL/L (ref 22–29)
CREAT SERPL-MCNC: 0.7 MG/DL (ref 0.5–0.9)
EOSINOPHILS ABSOLUTE: 0.2 K/UL (ref 0–0.6)
EOSINOPHILS RELATIVE PERCENT: 2.4 % (ref 0–5)
GFR NON-AFRICAN AMERICAN: >60
GLUCOSE BLD-MCNC: 93 MG/DL (ref 74–109)
HCT VFR BLD CALC: 48.5 % (ref 37–47)
HDLC SERPL-MCNC: 48 MG/DL (ref 65–121)
HEMOGLOBIN: 15.6 G/DL (ref 12–16)
INR BLD: 0.91 (ref 0.88–1.18)
LDL CHOLESTEROL CALCULATED: 114 MG/DL
LYMPHOCYTES ABSOLUTE: 1.9 K/UL (ref 1.1–4.5)
LYMPHOCYTES RELATIVE PERCENT: 24.7 % (ref 20–40)
MCH RBC QN AUTO: 30.2 PG (ref 27–31)
MCHC RBC AUTO-ENTMCNC: 32.2 G/DL (ref 33–37)
MCV RBC AUTO: 93.8 FL (ref 81–99)
MONOCYTES ABSOLUTE: 0.6 K/UL (ref 0–0.9)
MONOCYTES RELATIVE PERCENT: 7.6 % (ref 0–10)
NEUTROPHILS ABSOLUTE: 4.8 K/UL (ref 1.5–7.5)
NEUTROPHILS RELATIVE PERCENT: 63.6 % (ref 50–65)
PDW BLD-RTO: 11.9 % (ref 11.5–14.5)
PLATELET # BLD: 290 K/UL (ref 130–400)
PMV BLD AUTO: 10.9 FL (ref 9.4–12.3)
POTASSIUM SERPL-SCNC: 4.2 MMOL/L (ref 3.5–5)
PROTHROMBIN TIME: 11.7 SEC (ref 12–14.6)
RBC # BLD: 5.17 M/UL (ref 4.2–5.4)
SODIUM BLD-SCNC: 141 MMOL/L (ref 136–145)
TOTAL PROTEIN: 6.9 G/DL (ref 6.6–8.7)
TRIGL SERPL-MCNC: 162 MG/DL (ref 0–149)
WBC # BLD: 7.5 K/UL (ref 4.8–10.8)

## 2019-05-10 PROCEDURE — 99213 OFFICE O/P EST LOW 20 MIN: CPT | Performed by: NURSE PRACTITIONER

## 2019-05-10 ASSESSMENT — ENCOUNTER SYMPTOMS: COLOR CHANGE: 1

## 2019-05-10 NOTE — PROGRESS NOTES
SUBJECTIVE:    Patient ID: Don Hemphill is a63 y.o. female. Don Hemphill is here today for Rash (Patient presents for follow up on rash on CHARANJIT legs; getting worse. Patient is fasting for blood work if needed.)  . HPI:   HPI     Pt states that she is here today for discoloration to ble. Pt states that she had never noticed color change until last office visit and has been monitoring skin. Pt states that she has had med change with otc tumeric/cumin complex, lugos iodine. Legs ache but pt has always contributed to bones. Pt states that legs hurt worst when lying down. tx-hot bath      Pain/callus to bilat toes r/t wt bearing area  Onset was years/decades ago  Never has seen podiatry and is wanting supports for toes. Past Medical History:   Diagnosis Date    Bright's disease     COPD (chronic obstructive pulmonary disease) (HCC)     Degenerative disorder of bone     GERD (gastroesophageal reflux disease)     Hyperlipidemia     Ulcer      Prior to Visit Medications    Medication Sig Taking? Authorizing Provider   Black Pepper-Turmeric (TURMERIC COMPLEX/BLACK PEPPER) 3-500 MG CAPS Take by mouth Yes Historical Provider, MD   Iodine Strong, Lugols, (STRONG IODINE) 5 % solution Take 0.2 mLs by mouth 3 times daily Yes Historical Provider, MD   esomeprazole (NEXIUM) 40 MG delayed release capsule Take 1 capsule by mouth every morning (before breakfast) Yes LUCI Dubon   rosuvastatin (CRESTOR) 10 MG tablet TAKE 1 TABLET BY MOUTH EVERYDAY AT BEDTIME Yes LUCI Dubon   albuterol sulfate  (90 Base) MCG/ACT inhaler Inhale 2 puffs into the lungs every 6 hours as needed for Wheezing Yes LUCI Dubon   APPLE CIDER VINEGAR PO Take by mouth Yes Historical Provider, MD   Cyanocobalamin (VITAMIN B 12 PO) Take by mouth Yes Historical Provider, MD   phentermine (ADIPEX-P) 37.5 MG tablet Take 37.5 mg by mouth every morning (before breakfast). Soto Romo Historical Provider, MD   nicotine (NICOTROL) 10 MG inhaler Inhale 1 puff into the lungs as needed for Smoking cessation Yes LUCI Dubon   clobetasol (TEMOVATE) 0.05 % ointment Apply topically 2 times daily. Yes LUCI Dubon   LORATADINE ALLERGY RELIEF PO Take by mouth Yes Historical Provider, MD   Cholecalciferol (VITAMIN D3) 5000 UNITS CAPS Take by mouth Yes Historical Provider, MD   Polyethylene Glycol 3350 (MIRALAX PO) Take by mouth Yes Historical Provider, MD     No Known Allergies  Past Surgical History:   Procedure Laterality Date    ABDOMINAL EXPLORATION SURGERY      CHOLECYSTECTOMY      EYE SURGERY      FINGER SURGERY       No family history on file.   Social History     Socioeconomic History    Marital status:      Spouse name: Not on file    Number of children: Not on file    Years of education: Not on file    Highest education level: Not on file   Occupational History    Not on file   Social Needs    Financial resource strain: Not on file    Food insecurity:     Worry: Not on file     Inability: Not on file    Transportation needs:     Medical: Not on file     Non-medical: Not on file   Tobacco Use    Smoking status: Current Every Day Smoker     Packs/day: 0.75     Years: 40.00     Pack years: 30.00     Types: Cigarettes     Start date: 11/28/1978    Smokeless tobacco: Never Used   Substance and Sexual Activity    Alcohol use: No    Drug use: No    Sexual activity: Not on file   Lifestyle    Physical activity:     Days per week: Not on file     Minutes per session: Not on file    Stress: Not on file   Relationships    Social connections:     Talks on phone: Not on file     Gets together: Not on file     Attends Religion service: Not on file     Active member of club or organization: Not on file     Attends meetings of clubs or organizations: Not on file     Relationship status: Not on file    Intimate partner violence:     Fear of current or ex partner: Not on file     Emotionally abused: Not on file Physically abused: Not on file     Forced sexual activity: Not on file   Other Topics Concern    Not on file   Social History Narrative    Not on file       Review of Systems   Musculoskeletal: Positive for arthralgias. Skin: Positive for color change. Hematological: Bruises/bleeds easily. OBJECTIVE:    Physical Exam   Constitutional: She is oriented to person, place, and time. She appears well-developed and well-nourished. HENT:   Head: Normocephalic and atraumatic. Eyes: Pupils are equal, round, and reactive to light. Conjunctivae and EOM are normal.   Neck: Neck supple. No tracheal deviation present. Cardiovascular: Normal rate, regular rhythm and normal heart sounds. Pulmonary/Chest: Effort normal and breath sounds normal.   Neurological: She is alert and oriented to person, place, and time. Skin: Skin is warm and dry. Psychiatric: She has a normal mood and affect. Her speech is normal and behavior is normal. Judgment and thought content normal. Her mood appears not anxious. Her affect is not angry. Cognition and memory are normal. She does not exhibit a depressed mood. Nursing note and vitals reviewed. /80 (Site: Left Upper Arm, Position: Sitting, Cuff Size: Large Adult)   Pulse 77   Temp 98.2 °F (36.8 °C) (Temporal)   Resp 20   Wt 181 lb 4 oz (82.2 kg)   SpO2 98%   BMI 34.25 kg/m²      ASSESSMENT:      ICD-10-CM    1. Mottled skin L81.9 [facility], APRN, Vascular Surgery, [place]   2. Chronic pain of toe, unspecified laterality M79.676 External Referral To Podiatry    G89.29    3. Bruising T14. 8XXA CBC Auto Differential   4. Mixed hyperlipidemia E78.2 Comprehensive Metabolic Panel     Lipid Panel       PLAN:    Juliana Mcknight: Rash (Patient presents for follow up on rash on CHARANJIT legs; getting worse.  Patient is fasting for blood work if needed.)  Referrals as above  Lab today  Elevate legs 20mins every 2hrs when awake  F/u prn  Diagnosis and orders for this visit are above. Please note that this chart was generated using dragon dictationsoftware. Although every effort was made to ensure the accuracy of this automated transcription, some errors in transcription may have occurred.

## 2019-05-30 ENCOUNTER — OFFICE VISIT (OUTPATIENT)
Dept: VASCULAR SURGERY | Age: 59
End: 2019-05-30
Payer: COMMERCIAL

## 2019-05-30 VITALS
DIASTOLIC BLOOD PRESSURE: 77 MMHG | SYSTOLIC BLOOD PRESSURE: 121 MMHG | HEART RATE: 78 BPM | TEMPERATURE: 96.7 F | OXYGEN SATURATION: 97 % | RESPIRATION RATE: 18 BRPM

## 2019-05-30 DIAGNOSIS — L81.9 DISCOLORATION OF SKIN OF LOWER LEG: ICD-10-CM

## 2019-05-30 DIAGNOSIS — M79.605 LEG PAIN, BILATERAL: ICD-10-CM

## 2019-05-30 DIAGNOSIS — M79.89 LEG SWELLING: ICD-10-CM

## 2019-05-30 DIAGNOSIS — M79.604 LEG PAIN, BILATERAL: ICD-10-CM

## 2019-05-30 PROCEDURE — 99203 OFFICE O/P NEW LOW 30 MIN: CPT | Performed by: PHYSICIAN ASSISTANT

## 2019-05-30 NOTE — PROGRESS NOTES
breakfast). Tino Lee nicotine (NICOTROL) 10 MG inhaler Inhale 1 puff into the lungs as needed for Smoking cessation 1 Inhaler 3    clobetasol (TEMOVATE) 0.05 % ointment Apply topically 2 times daily. 45 g 1    LORATADINE ALLERGY RELIEF PO Take by mouth      Cholecalciferol (VITAMIN D3) 5000 UNITS CAPS Take by mouth      Polyethylene Glycol 3350 (MIRALAX PO) Take by mouth       No current facility-administered medications for this visit. Allergies: Patient has no known allergies. Past Medical History:   Diagnosis Date    Bright's disease     COPD (chronic obstructive pulmonary disease) (HCC)     Degenerative disorder of bone     GERD (gastroesophageal reflux disease)     Hyperlipidemia     Long's toe     Ulcer      Past Surgical History:   Procedure Laterality Date    ABDOMINAL EXPLORATION SURGERY      CHOLECYSTECTOMY      EYE SURGERY      FINGER SURGERY      GALLBLADDER SURGERY       History reviewed. No pertinent family history. Social History     Tobacco Use    Smoking status: Current Every Day Smoker     Packs/day: 0.75     Years: 40.00     Pack years: 30.00     Types: Cigarettes     Start date: 11/28/1978    Smokeless tobacco: Never Used   Substance Use Topics    Alcohol use: No       Old records obtained from the referring provider. These records have been reviewed and summarized. Review of Systems    Constitutional - no significant activity change, appetite change, or unexpected weight change. No fever or chills. No diaphoresis or significant fatigue. HENT - no significant rhinorrhea or epistaxis. No tinnitus or significant hearing loss. Eyes - no sudden vision change or amaurosis. Respiratory - no significant shortness of breath, wheezing, or stridor. No apnea, cough, or chest tightness associated with shortness of breath. Cardiovascular - no chest pain, syncope, or significant dizziness. No palpitations. She reports leg swelling. No claudication.   Gastrointestinal - no abdominal swelling or pain. No blood in stool. No severe constipation, diarrhea, nausea, or vomiting. Genitourinary - No difficulty urinating, dysuria, frequency, or urgency. No flank pain or hematuria. Musculoskeletal - no back pain, gait disturbance, or myalgia. Skin - no color change, rash, pallor, or new wound. Neurologic - no dizziness, facial asymmetry, or light headedness. No seizures. No speech difficulty or lateralizing weakness. Hematologic - no easy bruising or excessive bleeding. Psychiatric - no severe anxiety or nervousness. No confusion. All other review of systems are negative. Physical Exam    /77 Comment: left  Pulse 78   Temp 96.7 °F (35.9 °C)   Resp 18   SpO2 97%     Constitutional - well developed, well nourished. No diaphoresis or acute distress. HENT - head normocephalic. Right external ear canal appears normal.  Left external ear canal appears normal.  Septum appears midline. Eyes - conjunctiva normal.  EOMS normal.  No exudate. No icterus. Neck- ROM appears normal, no tracheal deviation. Cardiovascular - Regular rate and rhythm. Heart sounds are normal.  No murmur, rub, or gallop. Carotid pulses are 2+ to palpation bilaterally without bruit. Extremities - Radial and brachial pulses are 2+ to palpation bilaterally. DP and PT pulses are palpable. She has a bluish-purple somewhat mottled discoloration that is blanchable of her lower legs/feet that dissipates with elevation. She has some mild LE swelling and a few spider veins noted on her LE bilaterally. No non healing wounds. Feet are warm. No clubbing. No signs atheroembolic event. Pulmonary - effort appears normal.  No respiratory distress. Lungs - Breath sounds normal. No wheezes or rales. GI - Abdomen - soft, non tender, bowel sounds X 4 quadrants. No guarding or rebound tenderness. No distension or palpable mass. Genitourinary - deferred.   Musculoskeletal - ROM appears normal. Neurologic - alert and oriented X 3. Physiologic. Skin - warm, dry, and intact. No rash, erythema, or pallor. Psychiatric - mood, affect, and behavior appear normal. Judgment and thought processes appear normal.      Assessment    1. Leg swelling    2. Leg pain, bilateral    3. Discoloration of skin of lower leg          Plan      We discussed the most likely cause of her LE discoloration is due to probable venous insufficiency. We discussed the natural history of venous reflux. She has no claudication and palpable pulses, so I do not think there is any issue with arterial blood flow. Recommend Nsaids for pain control  Support hose  20-30 mmHg compression   Recommend leg elevation above the level of the heart  Good moisturization  Good skin care  Diet and exercise daily  If her symptoms worsens she will call for an appointment for re evaluation of her legs and possibley obtain a BLE venous study for reflux. Otherwise we will follow up PRN.

## 2019-05-31 PROBLEM — M79.89 LEG SWELLING: Status: ACTIVE | Noted: 2019-05-31

## 2019-05-31 PROBLEM — M79.605 LEG PAIN, BILATERAL: Status: ACTIVE | Noted: 2019-05-31

## 2019-05-31 PROBLEM — M79.604 LEG PAIN, BILATERAL: Status: ACTIVE | Noted: 2019-05-31

## 2019-05-31 PROBLEM — L81.9 DISCOLORATION OF SKIN OF LOWER LEG: Status: ACTIVE | Noted: 2019-05-31

## 2019-06-21 ENCOUNTER — OFFICE VISIT (OUTPATIENT)
Dept: FAMILY MEDICINE CLINIC | Age: 59
End: 2019-06-21
Payer: COMMERCIAL

## 2019-06-21 VITALS
DIASTOLIC BLOOD PRESSURE: 70 MMHG | SYSTOLIC BLOOD PRESSURE: 116 MMHG | OXYGEN SATURATION: 99 % | RESPIRATION RATE: 20 BRPM | WEIGHT: 181.5 LBS | HEART RATE: 71 BPM | BODY MASS INDEX: 34.29 KG/M2 | TEMPERATURE: 98.2 F

## 2019-06-21 DIAGNOSIS — B35.1 ONYCHOMYCOSIS OF LEFT GREAT TOE: ICD-10-CM

## 2019-06-21 DIAGNOSIS — Z71.6 ENCOUNTER FOR SMOKING CESSATION COUNSELING: Primary | ICD-10-CM

## 2019-06-21 PROCEDURE — 99213 OFFICE O/P EST LOW 20 MIN: CPT | Performed by: NURSE PRACTITIONER

## 2019-06-21 RX ORDER — TERBINAFINE HYDROCHLORIDE 250 MG/1
TABLET ORAL
Qty: 90 TABLET | Refills: 0 | Status: SHIPPED | OUTPATIENT
Start: 2019-06-21 | End: 2021-01-27 | Stop reason: ALTCHOICE

## 2019-06-21 RX ORDER — BUPROPION HYDROCHLORIDE 150 MG/1
150 TABLET ORAL EVERY MORNING
Qty: 90 TABLET | Refills: 1 | Status: SHIPPED | OUTPATIENT
Start: 2019-06-21 | End: 2021-01-27

## 2019-06-21 ASSESSMENT — ENCOUNTER SYMPTOMS: SHORTNESS OF BREATH: 0

## 2019-06-21 NOTE — PROGRESS NOTES
SUBJECTIVE:    Patient ID: Issa Hussein is a63 y.o. female. Issa Hussein is here today for Nicotine Dependence (Patient presents wanting to quit smoking and wants to take wellbutrin)  . HPI:   HPI       Pt is here today for smoking cessation discussion. Pt specifically would like to start wellbutrin. Pt states that she is motivated to stop smoking. Pt has tried patches but cannot tolerate. Smoker 3/4ppd  Has used chantix in past.    Pt is stating that lamisil helped left great toenail fungus. She would like to restart as nail has not fully resolved. Tolerated med fine. Past Medical History:   Diagnosis Date    Bright's disease     COPD (chronic obstructive pulmonary disease) (HCC)     Degenerative disorder of bone     GERD (gastroesophageal reflux disease)     Hyperlipidemia     Long's toe     Ulcer      Prior to Visit Medications    Medication Sig Taking?  Authorizing Provider   terbinafine (LAMISIL) 250 MG tablet 1 po daily x 12wks once you are notified of lab Yes LUCI Dubon   buPROPion (WELLBUTRIN XL) 150 MG extended release tablet Take 1 tablet by mouth every morning Yes LUCI Dubon   Black Pepper-Turmeric (TURMERIC COMPLEX/BLACK PEPPER) 3-500 MG CAPS Take by mouth Yes Historical Provider, MD   Iodine Strong, Lugols, (STRONG IODINE) 5 % solution Take 0.2 mLs by mouth 3 times daily Yes Historical Provider, MD   esomeprazole (NEXIUM) 40 MG delayed release capsule Take 1 capsule by mouth every morning (before breakfast) Yes LUCI Dubon   rosuvastatin (CRESTOR) 10 MG tablet TAKE 1 TABLET BY MOUTH EVERYDAY AT BEDTIME Yes LUCI Dubon   albuterol sulfate  (90 Base) MCG/ACT inhaler Inhale 2 puffs into the lungs every 6 hours as needed for Wheezing Yes LUCI Dubon   APPLE CIDER VINEGAR PO Take by mouth Yes Historical Provider, MD   Cyanocobalamin (VITAMIN B 12 PO) Take by mouth Yes Historical Provider, MD   phentermine (ADIPEX-P) 37.5 MG tablet Take 37.5 mg by mouth every morning (before breakfast). . Yes Historical Provider, MD   nicotine (NICOTROL) 10 MG inhaler Inhale 1 puff into the lungs as needed for Smoking cessation Yes LUCI Dubon   clobetasol (TEMOVATE) 0.05 % ointment Apply topically 2 times daily. Yes LUCI Dubon   LORATADINE ALLERGY RELIEF PO Take by mouth Yes Historical Provider, MD   Cholecalciferol (VITAMIN D3) 5000 UNITS CAPS Take by mouth Yes Historical Provider, MD   Polyethylene Glycol 3350 (MIRALAX PO) Take by mouth Yes Historical Provider, MD     No Known Allergies  Past Surgical History:   Procedure Laterality Date    ABDOMINAL EXPLORATION SURGERY      CHOLECYSTECTOMY      EYE SURGERY      FINGER SURGERY      GALLBLADDER SURGERY       History reviewed. No pertinent family history.   Social History     Socioeconomic History    Marital status:      Spouse name: Not on file    Number of children: Not on file    Years of education: Not on file    Highest education level: Not on file   Occupational History    Not on file   Social Needs    Financial resource strain: Not on file    Food insecurity:     Worry: Not on file     Inability: Not on file    Transportation needs:     Medical: Not on file     Non-medical: Not on file   Tobacco Use    Smoking status: Current Every Day Smoker     Packs/day: 0.75     Years: 40.00     Pack years: 30.00     Types: Cigarettes     Start date: 11/28/1978    Smokeless tobacco: Never Used   Substance and Sexual Activity    Alcohol use: No    Drug use: No    Sexual activity: Not on file   Lifestyle    Physical activity:     Days per week: Not on file     Minutes per session: Not on file    Stress: Not on file   Relationships    Social connections:     Talks on phone: Not on file     Gets together: Not on file     Attends Sabianism service: Not on file     Active member of club or organization: Not on file     Attends meetings of clubs or organizations: Not on file     Relationship any increase in negativity. Never stop medication without consulting healthcare provider. Diagnosis and orders for this visit are above. Please note that this chart was generated using dragon dictationsoftware. Although every effort was made to ensure the accuracy of this automated transcription, some errors in transcription may have occurred.

## 2019-06-22 DIAGNOSIS — K21.9 GASTROESOPHAGEAL REFLUX DISEASE WITHOUT ESOPHAGITIS: ICD-10-CM

## 2019-06-25 RX ORDER — ESOMEPRAZOLE MAGNESIUM 40 MG/1
CAPSULE, DELAYED RELEASE ORAL
Qty: 90 CAPSULE | Refills: 1 | Status: SHIPPED | OUTPATIENT
Start: 2019-06-25 | End: 2019-09-06 | Stop reason: SDUPTHER

## 2019-06-28 ENCOUNTER — TELEPHONE (OUTPATIENT)
Dept: FAMILY MEDICINE CLINIC | Age: 59
End: 2019-06-28

## 2019-07-16 ENCOUNTER — TELEPHONE (OUTPATIENT)
Dept: FAMILY MEDICINE CLINIC | Age: 59
End: 2019-07-16

## 2019-07-29 ENCOUNTER — TELEPHONE (OUTPATIENT)
Dept: FAMILY MEDICINE CLINIC | Age: 59
End: 2019-07-29

## 2019-07-29 NOTE — TELEPHONE ENCOUNTER
We have attempted PA on nexium for patient twice with appeal.  It is denied. They recommend omeprazole, lansoprazole, pantoprazole or rabeprazole.

## 2019-09-30 ENCOUNTER — OFFICE VISIT (OUTPATIENT)
Dept: FAMILY MEDICINE CLINIC | Age: 59
End: 2019-09-30
Payer: COMMERCIAL

## 2019-09-30 VITALS
TEMPERATURE: 98.6 F | WEIGHT: 187 LBS | DIASTOLIC BLOOD PRESSURE: 78 MMHG | OXYGEN SATURATION: 98 % | BODY MASS INDEX: 35.33 KG/M2 | SYSTOLIC BLOOD PRESSURE: 128 MMHG | HEART RATE: 92 BPM | RESPIRATION RATE: 20 BRPM

## 2019-09-30 DIAGNOSIS — Z12.31 SCREENING MAMMOGRAM, ENCOUNTER FOR: ICD-10-CM

## 2019-09-30 DIAGNOSIS — M25.552 LEFT HIP PAIN: Primary | ICD-10-CM

## 2019-09-30 DIAGNOSIS — L81.9 MOTTLED SKIN: ICD-10-CM

## 2019-09-30 PROCEDURE — 99214 OFFICE O/P EST MOD 30 MIN: CPT | Performed by: NURSE PRACTITIONER

## 2019-09-30 RX ORDER — METHYLPREDNISOLONE 4 MG/1
TABLET ORAL
Qty: 1 KIT | Refills: 0 | Status: SHIPPED | OUTPATIENT
Start: 2019-09-30 | End: 2019-10-19 | Stop reason: ALTCHOICE

## 2019-09-30 RX ORDER — CYCLOBENZAPRINE HCL 5 MG
5 TABLET ORAL EVERY 8 HOURS PRN
COMMUNITY
End: 2019-10-19 | Stop reason: ALTCHOICE

## 2019-09-30 ASSESSMENT — ENCOUNTER SYMPTOMS: COLOR CHANGE: 1

## 2019-09-30 NOTE — PROGRESS NOTES
SUBJECTIVE:    Patient ID: Alva Ball is a66 y.o. female. Alva Ball is here today for Groin Pain (Patient presents for follow up on groin on left side x 2 months and not getting better. Patient has been taking Cyclobenzaprine x 1 week.); Follow-Up from Hospital (Patient was seen at [facility] for groin pain); Referral - General (Patient wants to see a different vascular surgeon; not happy with [facility].); Hip Pain (left hip pain- was told that she has osteoarthritis in both hips. ); and Referral - General (referral to orthopedic surgeon for hips.)  . HPI:   HPI     Left groin pain  Onset was 2mo ago. Pt states that she turned body and \"crack like a knuckle\" and pain was immediate  tx- flexeril x 1wk--no help and reports being given steroid injection at ER. (records are not yet available, request has been made)  Pt states that pain is at worst when she is most active. Rest improves or keeps pain from worsening. Pt has gone to ER with pain. States xrays that were done show OA of hip. Pt was told she may need to consider seeing ortho r/t hip arthritis. Pt states that strain may be improving but with sitting around arthritis pain has been noted. Pt has been evaluated by vascular in [place] and would like to see a different vascular for opinion. Reports have now been received. Xray left hip showing mild degenerative changes to left hip joint. Past Medical History:   Diagnosis Date    Bright's disease     COPD (chronic obstructive pulmonary disease) (HCC)     Degenerative disorder of bone     GERD (gastroesophageal reflux disease)     Hyperlipidemia     Long's toe     Ulcer      Prior to Visit Medications    Medication Sig Taking? Authorizing Provider   cyclobenzaprine (FLEXERIL) 5 MG tablet Take 5 mg by mouth every 8 hours as needed for Muscle spasms Yes Historical Provider, MD   methylPREDNISolone (MEDROL DOSEPACK) 4 MG tablet Take by mouth. automated transcription, some errors in transcription may have occurred.

## 2019-10-18 ENCOUNTER — HOSPITAL ENCOUNTER (OUTPATIENT)
Dept: MRI IMAGING | Age: 59
Discharge: HOME | End: 2019-10-18
Payer: COMMERCIAL

## 2019-10-18 ENCOUNTER — TELEPHONE (OUTPATIENT)
Dept: FAMILY MEDICINE CLINIC | Age: 59
End: 2019-10-18

## 2019-10-18 DIAGNOSIS — M25.552 LEFT HIP PAIN: ICD-10-CM

## 2019-10-18 PROCEDURE — 73721 MRI JNT OF LWR EXTRE W/O DYE: CPT

## 2019-10-19 DIAGNOSIS — M25.552 LEFT HIP PAIN: ICD-10-CM

## 2019-10-19 DIAGNOSIS — M16.12 OSTEOARTHRITIS OF LEFT HIP, UNSPECIFIED OSTEOARTHRITIS TYPE: ICD-10-CM

## 2019-10-19 DIAGNOSIS — R93.89 ABNORMAL MRI: Primary | ICD-10-CM

## 2019-10-19 RX ORDER — TIZANIDINE 4 MG/1
4 TABLET ORAL EVERY 8 HOURS PRN
Qty: 60 TABLET | Refills: 0 | Status: SHIPPED | OUTPATIENT
Start: 2019-10-19 | End: 2021-01-27 | Stop reason: ALTCHOICE

## 2019-10-19 RX ORDER — PREDNISONE 20 MG/1
20 TABLET ORAL DAILY
Qty: 7 TABLET | Refills: 0 | Status: SHIPPED | OUTPATIENT
Start: 2019-10-19 | End: 2019-10-26

## 2019-10-22 DIAGNOSIS — R93.89 ABNORMAL MRI: ICD-10-CM

## 2019-10-22 DIAGNOSIS — M16.12 OSTEOARTHRITIS OF LEFT HIP, UNSPECIFIED OSTEOARTHRITIS TYPE: ICD-10-CM

## 2019-10-22 DIAGNOSIS — M25.552 PAIN OF LEFT HIP JOINT: Primary | ICD-10-CM

## 2020-03-24 RX ORDER — ROSUVASTATIN CALCIUM 10 MG/1
10 TABLET, COATED ORAL EVERY EVENING
Qty: 30 TABLET | Refills: 0 | Status: SHIPPED | OUTPATIENT
Start: 2020-03-24 | End: 2020-04-06 | Stop reason: SDUPTHER

## 2020-04-06 ENCOUNTER — TELEMEDICINE (OUTPATIENT)
Dept: FAMILY MEDICINE CLINIC | Age: 60
End: 2020-04-06
Payer: COMMERCIAL

## 2020-04-06 VITALS — TEMPERATURE: 97.8 F

## 2020-04-06 PROCEDURE — 99213 OFFICE O/P EST LOW 20 MIN: CPT | Performed by: NURSE PRACTITIONER

## 2020-04-06 RX ORDER — METHYLPREDNISOLONE 4 MG/1
TABLET ORAL
Qty: 1 KIT | Refills: 0 | Status: SHIPPED | OUTPATIENT
Start: 2020-04-06 | End: 2020-06-09 | Stop reason: ALTCHOICE

## 2020-04-06 RX ORDER — CLOBETASOL PROPIONATE 0.5 MG/G
OINTMENT TOPICAL
Qty: 30 G | Refills: 1 | Status: SHIPPED | OUTPATIENT
Start: 2020-04-06 | End: 2021-01-27 | Stop reason: ALTCHOICE

## 2020-04-06 RX ORDER — CLOBETASOL PROPIONATE 0.5 MG/G
OINTMENT TOPICAL
Qty: 30 G | Refills: 0 | Status: SHIPPED | OUTPATIENT
Start: 2020-04-06 | End: 2020-04-06

## 2020-04-06 RX ORDER — ROSUVASTATIN CALCIUM 10 MG/1
10 TABLET, COATED ORAL EVERY EVENING
Qty: 90 TABLET | Refills: 0 | Status: SHIPPED | OUTPATIENT
Start: 2020-04-06 | End: 2020-05-27 | Stop reason: SDUPTHER

## 2020-04-06 RX ORDER — SULFAMETHOXAZOLE AND TRIMETHOPRIM 800; 160 MG/1; MG/1
1 TABLET ORAL 2 TIMES DAILY
Qty: 20 TABLET | Refills: 0 | Status: SHIPPED | OUTPATIENT
Start: 2020-04-06 | End: 2020-04-16

## 2020-04-06 NOTE — PROGRESS NOTES
2020    TELEHEALTH EVALUATION -- Audio/Visual (During [place] [address] public health emergency)  Chief Complaint   Patient presents with    Wound Infection    Medication Refill     cholesterol med and clobetasol           Kaylan Elizabeth (:  1960) has requested an audio/video evaluation for the following concern(s):  HPI:  Pt reports that pain and redness began approx 3days ago and is changing/worsening since that time. Right middle finger  No fevers  Reports that it was painful at first but \"o\" pain now. tx- none   Has pmh of this and reversed with medrol dose pack. She has had culture in past and it was negative for MRSA. Pt has submitted 2 photos to media. Tender initially and redness reported. Photos reviewed     Pt is also requesting refills on cholesterol medication, crestor. Pt states that she is aware she is due for lab but with COVID19 would prefer to hold off on lab for now. I have agreed with this plan. Review of Systems   Constitutional: Negative for fever and unexpected weight change (gain reported but states it is expected with eating habits). Skin: Positive for rash. Prior to Visit Medications    Medication Sig Taking? Authorizing Provider   methylPREDNISolone (MEDROL DOSEPACK) 4 MG tablet Take by mouth. Yes LUCI Dubon   sulfamethoxazole-trimethoprim (BACTRIM DS;SEPTRA DS) 800-160 MG per tablet Take 1 tablet by mouth 2 times daily for 10 days Yes LUCI Dubon   rosuvastatin (CRESTOR) 10 MG tablet Take 1 tablet by mouth every evening Yes LUCI Dubon   clobetasol (TEMOVATE) 0.05 % ointment Apply topically 2 times daily. Yes LUCI Dubon   mupirocin (BACTROBAN) 2 % ointment Apply 3 times daily for wound.  Yes LUCI Dubon   tiZANidine (ZANAFLEX) 4 MG tablet Take 1 tablet by mouth every 8 hours as needed (muscle pain)  LUCI Dubon   esomeprazole (NEXIUM) 40 MG delayed release capsule Take 1 capsule by mouth every morning (before breakfast)  Mary Jane Bonner provider were located at their individual homes. --LUCI Becker on 4/6/2020 at 5:34 PM    An electronic signature was used to authenticate this note.

## 2020-05-27 ENCOUNTER — VIRTUAL VISIT (OUTPATIENT)
Dept: FAMILY MEDICINE CLINIC | Age: 60
End: 2020-05-27
Payer: COMMERCIAL

## 2020-05-27 PROCEDURE — 99213 OFFICE O/P EST LOW 20 MIN: CPT | Performed by: NURSE PRACTITIONER

## 2020-05-27 RX ORDER — ROSUVASTATIN CALCIUM 10 MG/1
10 TABLET, COATED ORAL EVERY EVENING
Qty: 90 TABLET | Refills: 1 | Status: SHIPPED | OUTPATIENT
Start: 2020-05-27 | End: 2020-11-13

## 2020-05-27 ASSESSMENT — ENCOUNTER SYMPTOMS: SHORTNESS OF BREATH: 0

## 2020-05-28 DIAGNOSIS — E78.2 MIXED HYPERLIPIDEMIA: ICD-10-CM

## 2020-05-28 LAB
ALBUMIN SERPL-MCNC: 4.2 G/DL (ref 3.5–5.2)
ALP BLD-CCNC: 112 U/L (ref 35–104)
ALT SERPL-CCNC: 13 U/L (ref 5–33)
ANION GAP SERPL CALCULATED.3IONS-SCNC: 15 MMOL/L (ref 7–19)
AST SERPL-CCNC: 14 U/L (ref 5–32)
BACTERIA: ABNORMAL /HPF
BASOPHILS ABSOLUTE: 0.1 K/UL (ref 0–0.2)
BASOPHILS RELATIVE PERCENT: 1.4 % (ref 0–1)
BILIRUB SERPL-MCNC: 0.3 MG/DL (ref 0.2–1.2)
BILIRUBIN URINE: NEGATIVE
BLOOD, URINE: ABNORMAL
BUN BLDV-MCNC: 14 MG/DL (ref 6–20)
CALCIUM SERPL-MCNC: 9.5 MG/DL (ref 8.6–10)
CHLORIDE BLD-SCNC: 102 MMOL/L (ref 98–111)
CHOLESTEROL, TOTAL: 242 MG/DL (ref 160–199)
CLARITY: ABNORMAL
CO2: 23 MMOL/L (ref 22–29)
COLOR: YELLOW
CREAT SERPL-MCNC: 0.7 MG/DL (ref 0.5–0.9)
EOSINOPHILS ABSOLUTE: 0.2 K/UL (ref 0–0.6)
EOSINOPHILS RELATIVE PERCENT: 3.2 % (ref 0–5)
EPITHELIAL CELLS, UA: ABNORMAL /HPF
GFR NON-AFRICAN AMERICAN: >60
GLUCOSE BLD-MCNC: 107 MG/DL (ref 74–109)
GLUCOSE URINE: NEGATIVE MG/DL
HCT VFR BLD CALC: 49.5 % (ref 37–47)
HDLC SERPL-MCNC: 43 MG/DL (ref 65–121)
HEMOGLOBIN: 15.9 G/DL (ref 12–16)
IMMATURE GRANULOCYTES #: 0 K/UL
KETONES, URINE: NEGATIVE MG/DL
LDL CHOLESTEROL CALCULATED: 160 MG/DL
LEUKOCYTE ESTERASE, URINE: NEGATIVE
LYMPHOCYTES ABSOLUTE: 2.1 K/UL (ref 1.1–4.5)
LYMPHOCYTES RELATIVE PERCENT: 29.7 % (ref 20–40)
MCH RBC QN AUTO: 29.8 PG (ref 27–31)
MCHC RBC AUTO-ENTMCNC: 32.1 G/DL (ref 33–37)
MCV RBC AUTO: 92.7 FL (ref 81–99)
MONOCYTES ABSOLUTE: 0.5 K/UL (ref 0–0.9)
MONOCYTES RELATIVE PERCENT: 7.6 % (ref 0–10)
NEUTROPHILS ABSOLUTE: 4.1 K/UL (ref 1.5–7.5)
NEUTROPHILS RELATIVE PERCENT: 57.7 % (ref 50–65)
NITRITE, URINE: NEGATIVE
PDW BLD-RTO: 12.4 % (ref 11.5–14.5)
PH UA: 5.5 (ref 5–8)
PLATELET # BLD: 298 K/UL (ref 130–400)
PMV BLD AUTO: 11.2 FL (ref 9.4–12.3)
POTASSIUM REFLEX MAGNESIUM: 4.7 MMOL/L (ref 3.5–5)
PROTEIN UA: NEGATIVE MG/DL
RBC # BLD: 5.34 M/UL (ref 4.2–5.4)
RBC UA: ABNORMAL /HPF (ref 0–2)
SODIUM BLD-SCNC: 140 MMOL/L (ref 136–145)
SPECIFIC GRAVITY UA: 1.03 (ref 1–1.03)
T4 FREE: 1.35 NG/DL (ref 0.93–1.7)
TOTAL PROTEIN: 6.8 G/DL (ref 6.6–8.7)
TRIGL SERPL-MCNC: 197 MG/DL (ref 0–149)
TSH SERPL DL<=0.05 MIU/L-ACNC: 1.56 UIU/ML (ref 0.27–4.2)
UROBILINOGEN, URINE: 0.2 E.U./DL
WBC # BLD: 7.1 K/UL (ref 4.8–10.8)
WBC UA: ABNORMAL /HPF (ref 0–5)

## 2020-06-09 ENCOUNTER — OFFICE VISIT (OUTPATIENT)
Dept: FAMILY MEDICINE CLINIC | Age: 60
End: 2020-06-09
Payer: COMMERCIAL

## 2020-06-09 VITALS
RESPIRATION RATE: 20 BRPM | BODY MASS INDEX: 39.84 KG/M2 | OXYGEN SATURATION: 98 % | HEIGHT: 61 IN | DIASTOLIC BLOOD PRESSURE: 82 MMHG | WEIGHT: 211 LBS | SYSTOLIC BLOOD PRESSURE: 118 MMHG | TEMPERATURE: 97.4 F | HEART RATE: 85 BPM

## 2020-06-09 DIAGNOSIS — R31.9 HEMATURIA, UNSPECIFIED TYPE: ICD-10-CM

## 2020-06-09 LAB
BACTERIA: ABNORMAL /HPF
BILIRUBIN URINE: NEGATIVE
BLOOD, URINE: ABNORMAL
CLARITY: ABNORMAL
COLOR: YELLOW
EPITHELIAL CELLS, UA: ABNORMAL /HPF
GLUCOSE URINE: NEGATIVE MG/DL
KETONES, URINE: NEGATIVE MG/DL
LEUKOCYTE ESTERASE, URINE: NEGATIVE
NITRITE, URINE: NEGATIVE
PH UA: 6 (ref 5–8)
PROTEIN UA: NEGATIVE MG/DL
RBC UA: ABNORMAL /HPF (ref 0–2)
SPECIFIC GRAVITY UA: 1.03 (ref 1–1.03)
UROBILINOGEN, URINE: 0.2 E.U./DL
WBC UA: ABNORMAL /HPF (ref 0–5)

## 2020-06-09 PROCEDURE — 99213 OFFICE O/P EST LOW 20 MIN: CPT | Performed by: FAMILY MEDICINE

## 2020-06-09 RX ORDER — SULFAMETHOXAZOLE AND TRIMETHOPRIM 800; 160 MG/1; MG/1
1 TABLET ORAL 2 TIMES DAILY
Qty: 14 TABLET | Refills: 0 | Status: SHIPPED | OUTPATIENT
Start: 2020-06-09 | End: 2020-06-16

## 2020-06-09 RX ORDER — FLUCONAZOLE 150 MG/1
150 TABLET ORAL ONCE
Qty: 2 TABLET | Refills: 0 | Status: SHIPPED | OUTPATIENT
Start: 2020-06-09 | End: 2020-06-09

## 2020-06-09 ASSESSMENT — PATIENT HEALTH QUESTIONNAIRE - PHQ9
SUM OF ALL RESPONSES TO PHQ9 QUESTIONS 1 & 2: 0
SUM OF ALL RESPONSES TO PHQ QUESTIONS 1-9: 0
1. LITTLE INTEREST OR PLEASURE IN DOING THINGS: 0
SUM OF ALL RESPONSES TO PHQ QUESTIONS 1-9: 0
2. FEELING DOWN, DEPRESSED OR HOPELESS: 0

## 2020-06-09 NOTE — PROGRESS NOTES
([employer]) 40 MG delayed release capsule Take 1 capsule by mouth every morning (before breakfast) 90 capsule 0    terbinafine (LAMISIL) 250 MG tablet 1 po daily x 12wks once you are notified of lab 90 tablet 0    buPROPion (WELLBUTRIN XL) 150 MG extended release tablet Take 1 tablet by mouth every morning 90 tablet 1    Black Pepper-Turmeric (TURMERIC COMPLEX/BLACK PEPPER) 3-500 MG CAPS Take by mouth      Iodine Strong, Lugols, (STRONG IODINE) 5 % solution Take 0.2 mLs by mouth 3 times daily      esomeprazole (NEXIUM) 40 MG delayed release capsule Take 1 capsule by mouth every morning (before breakfast) 90 capsule 1    albuterol sulfate  (90 Base) MCG/ACT inhaler Inhale 2 puffs into the lungs every 6 hours as needed for Wheezing 1 Inhaler 2    APPLE CIDER VINEGAR PO Take by mouth      Cyanocobalamin (VITAMIN B 12 PO) Take by mouth      nicotine (NICOTROL) 10 MG inhaler Inhale 1 puff into the lungs as needed for Smoking cessation 1 Inhaler 3    clobetasol (TEMOVATE) 0.05 % ointment Apply topically 2 times daily. 45 g 1    LORATADINE ALLERGY RELIEF PO Take by mouth      Cholecalciferol (VITAMIN D3) 5000 UNITS CAPS Take by mouth      Polyethylene Glycol 3350 (MIRALAX PO) Take by mouth      permethrin (ELIMITE) 5 % cream Apply topically as directed from neck to toes for 10hrs at bedtime x 1night and wash the next morning 1 Tube 0     No current facility-administered medications for this visit.          No Known Allergies    Past Surgical History:   Procedure Laterality Date    ABDOMINAL EXPLORATION SURGERY      CHOLECYSTECTOMY      EYE SURGERY      FINGER SURGERY      GALLBLADDER SURGERY         Social History     Tobacco Use    Smoking status: Current Every Day Smoker     Packs/day: 0.75     Years: 40.00     Pack years: 30.00     Types: Cigarettes     Start date: 11/28/1978    Smokeless tobacco: Never Used   Substance Use Topics    Alcohol use: No    Drug use: No       No family history on

## 2020-06-10 ENCOUNTER — HOSPITAL ENCOUNTER (OUTPATIENT)
Dept: CT IMAGING | Age: 60
Discharge: HOME | End: 2020-06-10
Payer: COMMERCIAL

## 2020-06-10 PROCEDURE — G0297 LDCT FOR LUNG CA SCREEN: HCPCS

## 2020-06-16 ENCOUNTER — TELEPHONE (OUTPATIENT)
Dept: FAMILY MEDICINE CLINIC | Age: 60
End: 2020-06-16

## 2020-06-17 RX ORDER — PERMETHRIN 50 MG/G
CREAM TOPICAL
Qty: 1 TUBE | Refills: 0 | Status: SHIPPED | OUTPATIENT
Start: 2020-06-17 | End: 2021-01-27 | Stop reason: ALTCHOICE

## 2020-06-30 ASSESSMENT — ENCOUNTER SYMPTOMS
BACK PAIN: 1
EYE DISCHARGE: 0
CONSTIPATION: 0
EYE PAIN: 0
RHINORRHEA: 0
COUGH: 0
NAUSEA: 0
ABDOMINAL PAIN: 0
VOMITING: 0
SHORTNESS OF BREATH: 0
DIARRHEA: 0

## 2020-09-25 ENCOUNTER — VIRTUAL VISIT (OUTPATIENT)
Dept: FAMILY MEDICINE CLINIC | Age: 60
End: 2020-09-25
Payer: COMMERCIAL

## 2020-09-25 PROCEDURE — 99213 OFFICE O/P EST LOW 20 MIN: CPT | Performed by: NURSE PRACTITIONER

## 2020-09-25 RX ORDER — ESOMEPRAZOLE MAGNESIUM 40 MG/1
40 CAPSULE, DELAYED RELEASE ORAL
Qty: 90 CAPSULE | Refills: 1 | Status: SHIPPED | OUTPATIENT
Start: 2020-09-25 | End: 2021-01-27 | Stop reason: SDUPTHER

## 2020-09-25 NOTE — PROGRESS NOTES
2020    TELEHEALTH EVALUATION -- Audio/Visual (During [facility] public health emergency)    Chief Complaint   Patient presents with    Other     \"food catching in throat\"           Toñito Peterchantel (:  1960) has requested an audio/video evaluation for the following concern(s):  HPI:  Pt reports having 3-4mo of \"food catching\" in throat. Pt does not feel that she has sx of acid. She states that the food triggers seem to include bread. She denies having any problems swallowing water/liquids. No vomiting reported. No abd pain. No sore throat. No sore area of anterior neck. Reports taking nexium every am on empty stomach. Review of Systems   Constitutional: Negative for unexpected weight change. HENT: Positive for trouble swallowing. Prior to Visit Medications    Medication Sig Taking? Authorizing Provider   esomeprazole (NEXIUM) 40 MG delayed release capsule Take 1 capsule by mouth every morning (before breakfast) Yes LUCI Dubon   esomeprazole (NEXIUM) 40 MG delayed release capsule Take 1 capsule by mouth every morning (before breakfast) Yes LUCI Dubon   permethrin (ELIMITE) 5 % cream Apply topically as directed from neck to toes for 10hrs at bedtime x 1night and wash the next morning  LUCI Dubon   rosuvastatin (CRESTOR) 10 MG tablet Take 1 tablet by mouth every evening  LUCI Dubon   clobetasol (TEMOVATE) 0.05 % ointment Apply topically 2 times daily.   LUCI Dubon   tiZANidine (ZANAFLEX) 4 MG tablet Take 1 tablet by mouth every 8 hours as needed (muscle pain)  LUCI Dubon   esomeprazole (NEXIUM) 40 MG delayed release capsule Take 1 capsule by mouth every morning (before breakfast)  LUCI Dubon   terbinafine (LAMISIL) 250 MG tablet 1 po daily x 12wks once you are notified of lab  LUCI Dubon   buPROPion (WELLBUTRIN XL) 150 MG extended release tablet Take 1 tablet by mouth every morning  LUCI Dubon   Black Pepper-Turmeric (TURMERIC COMPLEX/BLACK PEPPER) 3-500 MG CAPS Take by mouth  Historical Provider, MD   Iodine Strong, Lugols, (STRONG IODINE) 5 % solution Take 0.2 mLs by mouth 3 times daily  Historical Provider, MD   albuterol sulfate  (90 Base) MCG/ACT inhaler Inhale 2 puffs into the lungs every 6 hours as needed for Wheezing  LUCI Dubon   APPLE CIDER VINEGAR PO Take by mouth  Historical Provider, MD   Cyanocobalamin (VITAMIN B 12 PO) Take by mouth  Historical Provider, MD   nicotine (NICOTROL) 10 MG inhaler Inhale 1 puff into the lungs as needed for Smoking cessation  LUCI Dubon   clobetasol (TEMOVATE) 0.05 % ointment Apply topically 2 times daily. LUCI Dubon   LORATADINE ALLERGY RELIEF PO Take by mouth  Historical Provider, MD   Cholecalciferol (VITAMIN D3) 5000 UNITS CAPS Take by mouth  Historical Provider, MD   Polyethylene Glycol 3350 (MIRALAX PO) Take by mouth  Historical Provider, MD       Social History     Tobacco Use    Smoking status: Current Every Day Smoker     Packs/day: 0.75     Years: 40.00     Pack years: 30.00     Types: Cigarettes     Start date: 11/28/1978    Smokeless tobacco: Never Used   Substance Use Topics    Alcohol use: No    Drug use: No        No Known Allergies,   Past Medical History:   Diagnosis Date    Bright's disease     COPD (chronic obstructive pulmonary disease) (HCC)     Degenerative disorder of bone     GERD (gastroesophageal reflux disease)     Hyperlipidemia     Long's toe     Ulcer    ,   Past Surgical History:   Procedure Laterality Date    ABDOMINAL EXPLORATION SURGERY      CHOLECYSTECTOMY      EYE SURGERY      FINGER SURGERY      GALLBLADDER SURGERY     ,   Social History     Tobacco Use    Smoking status: Current Every Day Smoker     Packs/day: 0.75     Years: 40.00     Pack years: 30.00     Types: Cigarettes     Start date: 11/28/1978    Smokeless tobacco: Never Used   Substance Use Topics    Alcohol use: No    Drug use: No   , History reviewed. No pertinent family history. PHYSICAL EXAMINATION:  [ INSTRUCTIONS:  \"[x]\" Indicates a positive item  \"[]\" Indicates a negative item  -- DELETE ALL ITEMS NOT EXAMINED]  Vital Signs: There were no vitals taken for this visit. No flowsheet data found. Constitutional: [x] Appears well-developed and well-nourished [x] No apparent distress      [] Abnormal-   Mental status  [x] Alert and awake  [x] Oriented to person/place/time [x]Able to follow commands      Eyes:  EOM    [x]  Normal  [] Abnormal-  Sclera  [x]  Normal  [] Abnormal -         Discharge [x]  None visible  [] Abnormal -    HENT:   [x] Normocephalic, atraumatic. [] Abnormal   [x] Mouth/Throat: Mucous membranes are moist.     External Ears [x] Normal  [] Abnormal-     Neck: [x] No visualized mass     Pulmonary/Chest: [x] Respiratory effort normal.  [x] No visualized signs of difficulty breathing or respiratory distress        [] Abnormal-      Musculoskeletal:   [x] Normal gait with no signs of ataxia         [x] Normal range of motion of neck        [] Abnormal-       Neurological:        [x] No Facial Asymmetry (Cranial nerve 7 motor function) (limited exam to video visit)          [x] No gaze palsy        [] Abnormal-         Skin:        [x] No significant exanthematous lesions or discoloration noted on facial skin         [] Abnormal-            Psychiatric:       [x] Normal Affect [x] No Hallucinations        [] Abnormal-     Other pertinent observable physical exam findings-     ASSESSMENT/PLAN:  No flowsheet data found. 1. Pharyngoesophageal dysphagia    - External Referral To Gastroenterology  Continue Nexium 40mg every am.  No follow-ups on file. April Price is a 64 y.o. female being evaluated by a Virtual Visit (video visit) encounter to address concerns as mentioned above. A caregiver was present when appropriate.  Due to this being a TeleHealth encounter (During [place] [address] public health emergency), evaluation of the following organ systems was limited: Vitals/Constitutional/EENT/Resp/CV/GI//MS/Neuro/Skin/Heme-Lymph-Imm. Pursuant to the emergency declaration under the [address] [place] [address] and the [employer] and [employer] Act, this Virtual Visit was conducted with patient's (and/or legal guardian's) consent, to reduce the patient's risk of exposure to COVID-19 and provide necessary medical care. The patient (and/or legal guardian) has also been advised to contact this office for worsening conditions or problems, and seek emergency medical treatment and/or call 911 if deemed necessary. Services were provided through a video synchronous discussion virtually to substitute for in-person clinic visit. Patient and provider were located at their individual homes. --LUCI Anglin on 10/7/2020 at 9:23 AM    An electronic signature was used to authenticate this note.

## 2020-10-07 ASSESSMENT — ENCOUNTER SYMPTOMS: TROUBLE SWALLOWING: 1

## 2020-10-13 ENCOUNTER — TELEPHONE (OUTPATIENT)
Dept: ADMINISTRATIVE | Age: 60
End: 2020-10-13

## 2020-10-13 NOTE — TELEPHONE ENCOUNTER
Dr. Ham Channel called to advise us of her appt with them 11/12/2020 at 8 am. She asked that you please advise them to come about 15-30 minutes early to do new patient paperwork.

## 2020-11-13 RX ORDER — ROSUVASTATIN CALCIUM 10 MG/1
TABLET, COATED ORAL
Qty: 90 TABLET | Refills: 1 | Status: SHIPPED | OUTPATIENT
Start: 2020-11-13 | End: 2020-12-16 | Stop reason: SDUPTHER

## 2020-12-16 RX ORDER — ROSUVASTATIN CALCIUM 10 MG/1
TABLET, COATED ORAL
Qty: 90 TABLET | Refills: 1 | Status: SHIPPED | OUTPATIENT
Start: 2020-12-16 | End: 2021-01-27 | Stop reason: SDUPTHER

## 2020-12-16 RX ORDER — ESOMEPRAZOLE MAGNESIUM 40 MG/1
40 CAPSULE, DELAYED RELEASE ORAL
Qty: 90 CAPSULE | Refills: 1 | Status: SHIPPED | OUTPATIENT
Start: 2020-12-16 | End: 2021-01-27 | Stop reason: SDUPTHER

## 2021-01-27 ENCOUNTER — OFFICE VISIT (OUTPATIENT)
Dept: FAMILY MEDICINE CLINIC | Age: 61
End: 2021-01-27
Payer: COMMERCIAL

## 2021-01-27 VITALS
HEART RATE: 78 BPM | SYSTOLIC BLOOD PRESSURE: 108 MMHG | WEIGHT: 223 LBS | HEIGHT: 61 IN | BODY MASS INDEX: 42.1 KG/M2 | OXYGEN SATURATION: 98 % | RESPIRATION RATE: 18 BRPM | TEMPERATURE: 97 F | DIASTOLIC BLOOD PRESSURE: 72 MMHG

## 2021-01-27 DIAGNOSIS — N39.3 STRESS INCONTINENCE: ICD-10-CM

## 2021-01-27 DIAGNOSIS — Z12.31 SCREENING MAMMOGRAM, ENCOUNTER FOR: ICD-10-CM

## 2021-01-27 DIAGNOSIS — Z12.11 ENCOUNTER FOR SCREENING COLONOSCOPY: ICD-10-CM

## 2021-01-27 DIAGNOSIS — K21.9 GASTROESOPHAGEAL REFLUX DISEASE WITHOUT ESOPHAGITIS: ICD-10-CM

## 2021-01-27 DIAGNOSIS — Z13.21 ENCOUNTER FOR VITAMIN DEFICIENCY SCREENING: ICD-10-CM

## 2021-01-27 DIAGNOSIS — E78.2 MIXED HYPERLIPIDEMIA: ICD-10-CM

## 2021-01-27 DIAGNOSIS — E78.2 MIXED HYPERLIPIDEMIA: Primary | ICD-10-CM

## 2021-01-27 DIAGNOSIS — G89.29 CHRONIC LEFT HIP PAIN: ICD-10-CM

## 2021-01-27 DIAGNOSIS — E66.9 OBESE BODY HABITUS: ICD-10-CM

## 2021-01-27 DIAGNOSIS — M25.552 CHRONIC LEFT HIP PAIN: ICD-10-CM

## 2021-01-27 LAB
ALBUMIN SERPL-MCNC: 4.1 G/DL (ref 3.5–5.2)
ALP BLD-CCNC: 114 U/L (ref 35–104)
ALT SERPL-CCNC: 16 U/L (ref 5–33)
ANION GAP SERPL CALCULATED.3IONS-SCNC: 9 MMOL/L (ref 7–19)
AST SERPL-CCNC: 15 U/L (ref 5–32)
BACTERIA: ABNORMAL /HPF
BASOPHILS ABSOLUTE: 0.1 K/UL (ref 0–0.2)
BASOPHILS RELATIVE PERCENT: 1.1 % (ref 0–1)
BILIRUB SERPL-MCNC: <0.2 MG/DL (ref 0.2–1.2)
BILIRUBIN URINE: NEGATIVE
BLOOD, URINE: NEGATIVE
BUN BLDV-MCNC: 13 MG/DL (ref 6–20)
CALCIUM SERPL-MCNC: 9.5 MG/DL (ref 8.6–10)
CHLORIDE BLD-SCNC: 104 MMOL/L (ref 98–111)
CHOLESTEROL, TOTAL: 176 MG/DL (ref 160–199)
CLARITY: CLEAR
CO2: 27 MMOL/L (ref 22–29)
COLOR: YELLOW
CREAT SERPL-MCNC: 0.6 MG/DL (ref 0.5–0.9)
CRYSTALS, UA: ABNORMAL /HPF
EOSINOPHILS ABSOLUTE: 0.3 K/UL (ref 0–0.6)
EOSINOPHILS RELATIVE PERCENT: 3.7 % (ref 0–5)
EPITHELIAL CELLS, UA: ABNORMAL /HPF
GFR AFRICAN AMERICAN: >59
GFR NON-AFRICAN AMERICAN: >60
GLUCOSE BLD-MCNC: 103 MG/DL (ref 74–109)
GLUCOSE URINE: NEGATIVE MG/DL
HCT VFR BLD CALC: 48.1 % (ref 37–47)
HDLC SERPL-MCNC: 46 MG/DL (ref 65–121)
HEMOGLOBIN: 15.8 G/DL (ref 12–16)
IMMATURE GRANULOCYTES #: 0.1 K/UL
KETONES, URINE: NEGATIVE MG/DL
LDL CHOLESTEROL CALCULATED: 96 MG/DL
LEUKOCYTE ESTERASE, URINE: ABNORMAL
LYMPHOCYTES ABSOLUTE: 2 K/UL (ref 1.1–4.5)
LYMPHOCYTES RELATIVE PERCENT: 28.3 % (ref 20–40)
MCH RBC QN AUTO: 29.4 PG (ref 27–31)
MCHC RBC AUTO-ENTMCNC: 32.8 G/DL (ref 33–37)
MCV RBC AUTO: 89.4 FL (ref 81–99)
MONOCYTES ABSOLUTE: 0.5 K/UL (ref 0–0.9)
MONOCYTES RELATIVE PERCENT: 7.5 % (ref 0–10)
NEUTROPHILS ABSOLUTE: 4.1 K/UL (ref 1.5–7.5)
NEUTROPHILS RELATIVE PERCENT: 58.7 % (ref 50–65)
NITRITE, URINE: NEGATIVE
PDW BLD-RTO: 12.1 % (ref 11.5–14.5)
PH UA: 6.5 (ref 5–8)
PLATELET # BLD: 266 K/UL (ref 130–400)
PMV BLD AUTO: 11 FL (ref 9.4–12.3)
POTASSIUM REFLEX MAGNESIUM: 4.6 MMOL/L (ref 3.5–5)
PROTEIN UA: NEGATIVE MG/DL
RBC # BLD: 5.38 M/UL (ref 4.2–5.4)
RBC UA: ABNORMAL /HPF (ref 0–2)
SODIUM BLD-SCNC: 140 MMOL/L (ref 136–145)
SPECIFIC GRAVITY UA: 1.02 (ref 1–1.03)
TOTAL PROTEIN: 6.7 G/DL (ref 6.6–8.7)
TRIGL SERPL-MCNC: 171 MG/DL (ref 0–149)
UROBILINOGEN, URINE: 0.2 E.U./DL
VITAMIN D 25-HYDROXY: >100 NG/ML
WBC # BLD: 7 K/UL (ref 4.8–10.8)
WBC UA: ABNORMAL /HPF (ref 0–5)

## 2021-01-27 PROCEDURE — 99214 OFFICE O/P EST MOD 30 MIN: CPT | Performed by: NURSE PRACTITIONER

## 2021-01-27 RX ORDER — ESOMEPRAZOLE MAGNESIUM 40 MG/1
40 CAPSULE, DELAYED RELEASE ORAL 2 TIMES DAILY
Qty: 180 CAPSULE | Refills: 1 | Status: SHIPPED | OUTPATIENT
Start: 2021-01-27 | End: 2021-08-09

## 2021-01-27 RX ORDER — OXYBUTYNIN CHLORIDE 10 MG/1
10 TABLET, EXTENDED RELEASE ORAL DAILY
COMMUNITY
End: 2021-01-27 | Stop reason: SDUPTHER

## 2021-01-27 RX ORDER — ROSUVASTATIN CALCIUM 10 MG/1
TABLET, COATED ORAL
Qty: 90 TABLET | Refills: 1 | Status: SHIPPED | OUTPATIENT
Start: 2021-01-27 | End: 2021-10-07

## 2021-01-27 RX ORDER — OXYBUTYNIN CHLORIDE 10 MG/1
10 TABLET, EXTENDED RELEASE ORAL DAILY
Qty: 90 TABLET | Refills: 1 | Status: SHIPPED | OUTPATIENT
Start: 2021-01-27 | End: 2021-10-07

## 2021-01-27 ASSESSMENT — PATIENT HEALTH QUESTIONNAIRE - PHQ9
1. LITTLE INTEREST OR PLEASURE IN DOING THINGS: 0
SUM OF ALL RESPONSES TO PHQ QUESTIONS 1-9: 0
SUM OF ALL RESPONSES TO PHQ9 QUESTIONS 1 & 2: 0
2. FEELING DOWN, DEPRESSED OR HOPELESS: 0

## 2021-01-27 ASSESSMENT — ENCOUNTER SYMPTOMS
SHORTNESS OF BREATH: 0
CONSTIPATION: 1
DIARRHEA: 0
TROUBLE SWALLOWING: 1

## 2021-03-08 ENCOUNTER — OFFICE VISIT (OUTPATIENT)
Dept: FAMILY MEDICINE CLINIC | Age: 61
End: 2021-03-08
Payer: COMMERCIAL

## 2021-03-08 VITALS
HEIGHT: 61 IN | SYSTOLIC BLOOD PRESSURE: 128 MMHG | TEMPERATURE: 97 F | RESPIRATION RATE: 16 BRPM | OXYGEN SATURATION: 96 % | HEART RATE: 80 BPM | DIASTOLIC BLOOD PRESSURE: 74 MMHG | BODY MASS INDEX: 41.54 KG/M2 | WEIGHT: 220 LBS

## 2021-03-08 DIAGNOSIS — M25.511 CHRONIC RIGHT SHOULDER PAIN: Primary | ICD-10-CM

## 2021-03-08 DIAGNOSIS — G89.29 CHRONIC RIGHT SHOULDER PAIN: Primary | ICD-10-CM

## 2021-03-08 PROCEDURE — 99213 OFFICE O/P EST LOW 20 MIN: CPT | Performed by: FAMILY MEDICINE

## 2021-03-08 RX ORDER — CYCLOBENZAPRINE HCL 10 MG
10 TABLET ORAL 3 TIMES DAILY PRN
Qty: 45 TABLET | Refills: 0 | Status: SHIPPED | OUTPATIENT
Start: 2021-03-08 | End: 2021-03-23

## 2021-03-08 RX ORDER — LIDOCAINE 50 MG/G
1 PATCH TOPICAL DAILY
Qty: 30 PATCH | Refills: 0 | Status: SHIPPED | OUTPATIENT
Start: 2021-03-08 | End: 2021-04-07

## 2021-03-08 RX ORDER — DICLOFENAC SODIUM 75 MG/1
75 TABLET, DELAYED RELEASE ORAL 2 TIMES DAILY
Qty: 60 TABLET | Refills: 1 | Status: SHIPPED | OUTPATIENT
Start: 2021-03-08 | End: 2021-05-10

## 2021-03-08 NOTE — PROGRESS NOTES
[facility] PHYSICIAN SERVICES  [facility] [place]  [address] [id number]  Dept: [phone]  Dept Fax: [phone]: [phone]     Visit type: Established patient    Reason for Visit: Shoulder Pain (right worse but both hurt been going on bad for 3 weeks) and Arthritis      Assessment and Plan       1. Chronic right shoulder pain  -     diclofenac (VOLTAREN) 75 MG EC tablet; Take 1 tablet by mouth 2 times daily, Disp-60 tablet, R-1Normal  -     cyclobenzaprine (FLEXERIL) 10 MG tablet; Take 1 tablet by mouth 3 times daily as needed for Muscle spasms, Disp-45 tablet, R-0Normal  -     lidocaine (LIDODERM) 5 %; Place 1 patch onto the skin daily 12 hours on, 12 hours off., Disp-30 patch, R-0Normal  -     External Referral To Orthopedic Surgery    Discussed management options, expected course, and proper use of medication, including OTC medications if prescription is too expensive or insurance does not cover. Discussed lifestyle modifications that may be beneficial.  Discussed that if symptoms continue to worsen then involving specialist in her care may be warranted or possibly imaging. Discussed signs and symptoms requiring medical attention. All questions were answered and patient voiced understanding and agreement with plan as discussed. Return if symptoms worsen or fail to improve, for next scheduled follow up with PCP. Subjective       HPI   Patient reports that her right shoulder has been aching recently. She reports that it feels like a toothache and mostly has been affecting her sleep. She states that certain positions and movements seem to make the pain worse and others may be able to provided some benefit but traditionally moving her arm backwards gives her the problem. She reports that she has not had any injury or any events that she can specifically contribute to the shoulder pain beginning. She has been attempting use of ice and heating pads without much benefit. She states that is been going on for quite some time without any signs of improvement and may be as long as a year but has gotten worse recently. Review of Systems   Constitutional: Negative for activity change, appetite change, fatigue and fever. HENT: Negative for congestion and rhinorrhea. Eyes: Negative for pain and discharge. Respiratory: Negative for cough and shortness of breath. Cardiovascular: Negative for chest pain and palpitations. Gastrointestinal: Negative for abdominal pain, constipation, diarrhea, nausea and vomiting. Endocrine: Negative for cold intolerance and heat intolerance. Genitourinary: Negative for dysuria and hematuria. Musculoskeletal: Positive for arthralgias. Negative for back pain, gait problem and neck pain. Skin: Negative for rash and wound. Neurological: Negative for syncope and weakness. Hematological: Negative for adenopathy. Does not bruise/bleed easily. Psychiatric/Behavioral: Positive for sleep disturbance. Negative for dysphoric mood. The patient is not nervous/anxious.          No Known Allergies    Outpatient Medications Prior to Visit   Medication Sig Dispense Refill    rosuvastatin (CRESTOR) 10 MG tablet TAKE 1 TABLET BY MOUTH EVERY DAY IN THE EVENING 90 tablet 1    esomeprazole (NEXIUM) 40 MG delayed release capsule Take 1 capsule by mouth 2 times daily 180 capsule 1    oxybutynin (DITROPAN XL) 10 MG extended release tablet Take 1 tablet by mouth daily 90 tablet 1    orlistat (WILLIE) 60 MG capsule Take 1 capsule by mouth 3 times daily (with meals) 90 capsule 2    Black Pepper-Turmeric (TURMERIC COMPLEX/BLACK PEPPER) 3-500 MG CAPS Take by mouth Patient takes 3 a day      Iodine Strong, Lugols, (STRONG IODINE) 5 % solution Take 0.2 mLs by mouth 3 times daily      Cyanocobalamin (VITAMIN B 12 PO) Take by mouth      LORATADINE ALLERGY RELIEF PO Take by mouth      Cholecalciferol (VITAMIN D3) 5000 UNITS CAPS Take by mouth Patient takes 3 daily      Polyethylene Glycol 3350 (MIRALAX PO) Take by mouth       No facility-administered medications prior to visit. Past Medical History:   Diagnosis Date    Bright's disease     COPD (chronic obstructive pulmonary disease) (HCC)     Degenerative disorder of bone     GERD (gastroesophageal reflux disease)     Hyperlipidemia     Long's toe     Ulcer         Social History     Tobacco Use    Smoking status: Current Every Day Smoker     Packs/day: 0.75     Years: 40.00     Pack years: 30.00     Types: Cigarettes     Start date: 11/28/1978    Smokeless tobacco: Never Used   Substance Use Topics    Alcohol use: No        Past Surgical History:   Procedure Laterality Date    ABDOMINAL EXPLORATION SURGERY      CHOLECYSTECTOMY      EYE SURGERY      FINGER SURGERY      GALLBLADDER SURGERY         No family history on file. Objective       /74 (Site: Right Wrist, Position: Sitting, Cuff Size: Medium Adult)   Pulse 80   Temp 97 °F (36.1 °C) (Oral)   Resp 16   Ht 5' 1\" (1.549 m)   Wt 220 lb (99.8 kg)   SpO2 96%   BMI 41.57 kg/m²   Physical Exam  Vitals signs and nursing note reviewed. Constitutional:       General: She is not in acute distress. Appearance: Normal appearance. She is well-developed. She is not diaphoretic. HENT:      Head: Normocephalic and atraumatic. Right Ear: External ear normal.      Left Ear: External ear normal.      Mouth/Throat:      Comments: Mask in place  Eyes:      General: No scleral icterus. Right eye: No discharge. Left eye: No discharge. Conjunctiva/sclera: Conjunctivae normal.   Neck:      Musculoskeletal: Normal range of motion and neck supple. No muscular tenderness. Trachea: No tracheal deviation. Cardiovascular:      Rate and Rhythm: Normal rate and regular rhythm. Heart sounds: Normal heart sounds. No murmur. No friction rub. No gallop.     Pulmonary:      Effort: Pulmonary effort is normal. No respiratory distress. Breath sounds: Normal breath sounds. No wheezing or rales. Abdominal:      General: Bowel sounds are normal. There is no distension. Palpations: Abdomen is soft. Tenderness: There is no abdominal tenderness. Musculoskeletal:         General: No deformity (No gross deformities of upper or lower extremities) or signs of injury. Right lower leg: No edema. Left lower leg: No edema. Comments: No appreciable deformities or tenderness with right shoulder. Pain with lifting her arm above her head and dorsal movements of her arm. Lymphadenopathy:      Cervical: No cervical adenopathy. Skin:     General: Skin is warm and dry. Findings: No erythema or rash. Neurological:      Mental Status: She is alert and oriented to person, place, and time. Cranial Nerves: No cranial nerve deficit. Psychiatric:         Mood and Affect: Mood normal.         Behavior: Behavior normal.         Thought Content:  Thought content normal.           Data Reviewed and Summarized       Labs:     Imaging/Testing:        Jose Larson MD

## 2021-03-28 ASSESSMENT — ENCOUNTER SYMPTOMS
RHINORRHEA: 0
VOMITING: 0
EYE DISCHARGE: 0
COUGH: 0
EYE PAIN: 0
CONSTIPATION: 0
ABDOMINAL PAIN: 0
SHORTNESS OF BREATH: 0
NAUSEA: 0
BACK PAIN: 0
DIARRHEA: 0

## 2021-03-28 NOTE — PATIENT INSTRUCTIONS
Patient Education        Shoulder Stretches: Exercises  Introduction  Here are some examples of exercises for you to try. The exercises may be suggested for a condition or for rehabilitation. Start each exercise slowly. Ease off the exercises if you start to have pain. You will be told when to start these exercises and which ones will work best for you. How to do the exercises  Shoulder stretch   1.  a doorway and place one arm against the door frame. Your elbow should be a little higher than your shoulder. 2. Relax your shoulders as you lean forward, allowing your chest and shoulder muscles to stretch. You can also turn your body slightly away from your arm to stretch the muscles even more. 3. Hold for 15 to 30 seconds. 4. Repeat 2 to 4 times with each arm. Shoulder and chest stretch   1. Shoulder and chest stretch  2. While sitting, relax your upper body so you slump slightly in your chair. 3. As you breathe in, straighten your back and open your arms out to the sides. 4. Gently pull your shoulder blades back and downward. 5. Hold for 15 to 30 seconds as your breathe normally. 6. Repeat 2 to 4 times. Overhead stretch   1. Reach up over your head with both arms. 2. Hold for 15 to 30 seconds. 3. Repeat 2 to 4 times. Follow-up care is a key part of your treatment and safety. Be sure to make and go to all appointments, and call your doctor if you are having problems. It's also a good idea to know your test results and keep a list of the medicines you take. Where can you learn more? Go to https://[email or web address]. org and sign in to your [employer] account. Enter S254 in the [employer].com box to learn more about \"Shoulder Stretches: Exercises. \"     If you do not have an account, please click on the \"Sign Up Now\" link. Current as of: November 16, 2020               Content Version: 12.8  © 1744-1258 Healthwise, Incorporated.    Care instructions adapted under license by [facility] ([facility]). If you have questions about a medical condition or this instruction, always ask your healthcare professional. [place] [address] any warranty or liability for your use of this information.

## 2021-05-08 DIAGNOSIS — M25.511 CHRONIC RIGHT SHOULDER PAIN: ICD-10-CM

## 2021-05-08 DIAGNOSIS — G89.29 CHRONIC RIGHT SHOULDER PAIN: ICD-10-CM

## 2021-05-10 RX ORDER — DICLOFENAC SODIUM 75 MG/1
75 TABLET, DELAYED RELEASE ORAL 2 TIMES DAILY
Qty: 60 TABLET | Refills: 1 | Status: SHIPPED | OUTPATIENT
Start: 2021-05-10 | End: 2021-08-09

## 2021-08-08 DIAGNOSIS — K21.9 GASTROESOPHAGEAL REFLUX DISEASE WITHOUT ESOPHAGITIS: ICD-10-CM

## 2021-08-09 DIAGNOSIS — M25.511 CHRONIC RIGHT SHOULDER PAIN: ICD-10-CM

## 2021-08-09 DIAGNOSIS — G89.29 CHRONIC RIGHT SHOULDER PAIN: ICD-10-CM

## 2021-08-09 RX ORDER — ESOMEPRAZOLE MAGNESIUM 40 MG/1
CAPSULE, DELAYED RELEASE ORAL
Qty: 180 CAPSULE | Refills: 1 | Status: SHIPPED | OUTPATIENT
Start: 2021-08-09 | End: 2022-01-05 | Stop reason: SDUPTHER

## 2021-08-09 RX ORDER — DICLOFENAC SODIUM 75 MG/1
75 TABLET, DELAYED RELEASE ORAL 2 TIMES DAILY
Qty: 60 TABLET | Refills: 1 | Status: SHIPPED | OUTPATIENT
Start: 2021-08-09 | End: 2021-10-18

## 2021-10-16 DIAGNOSIS — G89.29 CHRONIC RIGHT SHOULDER PAIN: ICD-10-CM

## 2021-10-16 DIAGNOSIS — M25.511 CHRONIC RIGHT SHOULDER PAIN: ICD-10-CM

## 2021-10-18 RX ORDER — DICLOFENAC SODIUM 75 MG/1
75 TABLET, DELAYED RELEASE ORAL 2 TIMES DAILY
Qty: 60 TABLET | Refills: 1 | Status: SHIPPED | OUTPATIENT
Start: 2021-10-18 | End: 2022-01-05 | Stop reason: SDUPTHER

## 2021-12-15 DIAGNOSIS — G89.29 CHRONIC RIGHT SHOULDER PAIN: ICD-10-CM

## 2021-12-15 DIAGNOSIS — M25.511 CHRONIC RIGHT SHOULDER PAIN: ICD-10-CM

## 2021-12-15 RX ORDER — DICLOFENAC SODIUM 75 MG/1
75 TABLET, DELAYED RELEASE ORAL 2 TIMES DAILY
Qty: 60 TABLET | Refills: 1 | OUTPATIENT
Start: 2021-12-15

## 2022-01-05 ENCOUNTER — OFFICE VISIT (OUTPATIENT)
Dept: FAMILY MEDICINE CLINIC | Age: 62
End: 2022-01-05
Payer: COMMERCIAL

## 2022-01-05 VITALS
RESPIRATION RATE: 20 BRPM | TEMPERATURE: 97.5 F | OXYGEN SATURATION: 96 % | SYSTOLIC BLOOD PRESSURE: 122 MMHG | HEART RATE: 81 BPM | HEIGHT: 61 IN | DIASTOLIC BLOOD PRESSURE: 82 MMHG | BODY MASS INDEX: 41.35 KG/M2 | WEIGHT: 219 LBS

## 2022-01-05 DIAGNOSIS — E78.2 MIXED HYPERLIPIDEMIA: Primary | ICD-10-CM

## 2022-01-05 DIAGNOSIS — Z87.891 PERSONAL HISTORY OF TOBACCO USE: ICD-10-CM

## 2022-01-05 DIAGNOSIS — Z12.31 SCREENING MAMMOGRAM, ENCOUNTER FOR: ICD-10-CM

## 2022-01-05 DIAGNOSIS — Z23 NEED FOR SHINGLES VACCINE: ICD-10-CM

## 2022-01-05 DIAGNOSIS — Z13.21 ENCOUNTER FOR VITAMIN DEFICIENCY SCREENING: ICD-10-CM

## 2022-01-05 DIAGNOSIS — G89.29 CHRONIC RIGHT SHOULDER PAIN: ICD-10-CM

## 2022-01-05 DIAGNOSIS — K21.9 GASTROESOPHAGEAL REFLUX DISEASE WITHOUT ESOPHAGITIS: ICD-10-CM

## 2022-01-05 DIAGNOSIS — Z23 FLU VACCINE NEED: ICD-10-CM

## 2022-01-05 DIAGNOSIS — N39.3 STRESS INCONTINENCE: ICD-10-CM

## 2022-01-05 DIAGNOSIS — M25.511 CHRONIC RIGHT SHOULDER PAIN: ICD-10-CM

## 2022-01-05 PROCEDURE — G0296 VISIT TO DETERM LDCT ELIG: HCPCS | Performed by: NURSE PRACTITIONER

## 2022-01-05 PROCEDURE — 99214 OFFICE O/P EST MOD 30 MIN: CPT | Performed by: NURSE PRACTITIONER

## 2022-01-05 PROCEDURE — 90674 CCIIV4 VAC NO PRSV 0.5 ML IM: CPT | Performed by: NURSE PRACTITIONER

## 2022-01-05 PROCEDURE — 90471 IMMUNIZATION ADMIN: CPT | Performed by: NURSE PRACTITIONER

## 2022-01-05 RX ORDER — DILTIAZEM HYDROCHLORIDE 60 MG/1
TABLET, FILM COATED ORAL
Qty: 360 TABLET | Refills: 1 | Status: SHIPPED | OUTPATIENT
Start: 2022-01-05 | End: 2022-10-10 | Stop reason: SDUPTHER

## 2022-01-05 RX ORDER — OXYBUTYNIN CHLORIDE 10 MG/1
TABLET, EXTENDED RELEASE ORAL
Qty: 90 TABLET | Refills: 1 | Status: SHIPPED | OUTPATIENT
Start: 2022-01-05

## 2022-01-05 RX ORDER — ROSUVASTATIN CALCIUM 10 MG/1
TABLET, COATED ORAL
Qty: 90 TABLET | Refills: 1 | Status: SHIPPED | OUTPATIENT
Start: 2022-01-05 | End: 2022-07-11

## 2022-01-05 RX ORDER — DICLOFENAC SODIUM 75 MG/1
75 TABLET, DELAYED RELEASE ORAL 2 TIMES DAILY
Qty: 180 TABLET | Refills: 1 | Status: SHIPPED | OUTPATIENT
Start: 2022-01-05 | End: 2022-07-11

## 2022-01-05 RX ORDER — DILTIAZEM HYDROCHLORIDE 60 MG/1
TABLET, FILM COATED ORAL
COMMUNITY
End: 2022-01-05 | Stop reason: SDUPTHER

## 2022-01-05 RX ORDER — ESOMEPRAZOLE MAGNESIUM 40 MG/1
CAPSULE, DELAYED RELEASE ORAL
Qty: 180 CAPSULE | Refills: 1 | Status: SHIPPED | OUTPATIENT
Start: 2022-01-05

## 2022-01-05 SDOH — ECONOMIC STABILITY: FOOD INSECURITY: WITHIN THE PAST 12 MONTHS, YOU WORRIED THAT YOUR FOOD WOULD RUN OUT BEFORE YOU GOT MONEY TO BUY MORE.: NEVER TRUE

## 2022-01-05 SDOH — ECONOMIC STABILITY: FOOD INSECURITY: WITHIN THE PAST 12 MONTHS, THE FOOD YOU BOUGHT JUST DIDN'T LAST AND YOU DIDN'T HAVE MONEY TO GET MORE.: NEVER TRUE

## 2022-01-05 ASSESSMENT — SOCIAL DETERMINANTS OF HEALTH (SDOH): HOW HARD IS IT FOR YOU TO PAY FOR THE VERY BASICS LIKE FOOD, HOUSING, MEDICAL CARE, AND HEATING?: NOT HARD AT ALL

## 2022-01-05 NOTE — PROGRESS NOTES
SUBJECTIVE:    Patient ID: Demetrio Morrow is a66 y.o. female. Demetrio Morrow is here today for Medication Refill (Patient presents for medication refills and will come back for lab work.)  . HPI:   HPI       Patient is here today with her spouse. Patient states she needs medications refilled while here today. She is also inquiring of flu vaccine. Also, it does appear she is due for mammogram.    She does have history of vitamin D insufficiency and states that since having lab go the opposite direction, greater than 100, she has reduced only half supplementation with vitamin D. She does have history of restless leg syndrome and feels that with the decrease of vitamin D her symptoms may have worsened. We will be checking this with upcoming lab. She also has history of hyperlipidemia and does adhere to statin treatment but not dietary restrictions. She also continues to see gastro in [facility] in relation to GERD symptoms. She has also been started on new medication treatment and states that it is working very well. Past Medical History:   Diagnosis Date    Bright's disease     COPD (chronic obstructive pulmonary disease) (HCC)     Degenerative disorder of bone     GERD (gastroesophageal reflux disease)     Hyperlipidemia     Long's toe     Ulcer      Prior to Visit Medications    Medication Sig Taking?  Authorizing Provider   diclofenac (VOLTAREN) 75 MG EC tablet Take 1 tablet by mouth 2 times daily Yes LUCI Dubon   oxybutynin (DITROPAN-XL) 10 MG extended release tablet TAKE 1 TABLET BY MOUTH EVERY DAY Yes LUCI Dubon   rosuvastatin (CRESTOR) 10 MG tablet 1 po nightly for cholesterol Yes LUCI Dubon   esomeprazole (NEXIUM) 40 MG delayed release capsule TAKE 1 CAPSULE BY MOUTH TWICE A DAY Yes LUCI Dubon   dilTIAZem (CARDIZEM) 60 MG tablet TAKE 1 TABLET BY MOUTH FOUR TIMES A DAY Yes LUCI Dubon   Black Pepper-Turmeric (TURMERIC COMPLEX/BLACK PEPPER) 3-500 MG CAPS Take by mouth Patient takes 3 a day Yes Historical Provider, MD   Iodine Strong, Lugols, (STRONG IODINE) 5 % solution Take 0.2 mLs by mouth 3 times daily Yes Historical Provider, MD   Cyanocobalamin (VITAMIN B 12 PO) Take by mouth Yes Historical Provider, MD   LORATADINE ALLERGY RELIEF PO Take by mouth Yes Historical Provider, MD   Cholecalciferol (VITAMIN D3) 5000 UNITS CAPS Take by mouth Patient takes 3 daily Yes Historical Provider, MD   Polyethylene Glycol 3350 (MIRALAX PO) Take by mouth Yes Historical Provider, MD     No Known Allergies  Past Surgical History:   Procedure Laterality Date    ABDOMINAL EXPLORATION SURGERY      CHOLECYSTECTOMY      EYE SURGERY      FINGER SURGERY      GALLBLADDER SURGERY       History reviewed. No pertinent family history. Social History     Socioeconomic History    Marital status:      Spouse name: Not on file    Number of children: Not on file    Years of education: Not on file    Highest education level: Not on file   Occupational History    Not on file   Tobacco Use    Smoking status: Current Every Day Smoker     Packs/day: 0.75     Years: 40.00     Pack years: 30.00     Types: Cigarettes     Start date: 11/28/1978    Smokeless tobacco: Never Used   Substance and Sexual Activity    Alcohol use: No    Drug use: No    Sexual activity: Not on file   Other Topics Concern    Not on file   Social History Narrative    Not on file     Social Determinants of Health     Financial Resource Strain: Low Risk     Difficulty of Paying Living Expenses: Not hard at all   Food Insecurity: No Food Insecurity    Worried About [address] in the Last Year: Never true    [address]  in the Last Year: Never true   Transportation Needs:     Lack of Transportation (Medical): Not on file    Lack of Transportation (Non-Medical):  Not on file   Physical Activity:     Days of Exercise per Week: Not on file    Minutes of Exercise per Session: Not on file   Stress:     Feeling of Stress : Not on file   Social Connections:     Frequency of Communication with Friends and Family: Not on file    Frequency of Social Gatherings with Friends and Family: Not on file    Attends Hindu Services: Not on file    Active Member of Clubs or Organizations: Not on file    Attends Club or Organization Meetings: Not on file    Marital Status: Not on file   Intimate Partner Violence:     Fear of Current or Ex-Partner: Not on file    Emotionally Abused: Not on file    Physically Abused: Not on file    Sexually Abused: Not on file   Housing Stability:     Unable to Pay for Housing in the Last Year: Not on file    Number of [place] in the Last Year: Not on file    Unstable Housing in the Last Year: Not on file       Review of Systems   Constitutional: Negative. Respiratory: Negative. Cardiovascular: Negative. Genitourinary: Positive for frequency. Musculoskeletal: Positive for arthralgias. Neurological: Negative. OBJECTIVE:    Physical Exam  Vitals and nursing note reviewed. Constitutional:       Appearance: She is well-developed. She is obese. She is not ill-appearing. HENT:      Head: Normocephalic and atraumatic. Eyes:      Extraocular Movements: Extraocular movements intact. Conjunctiva/sclera: Conjunctivae normal.      Pupils: Pupils are equal, round, and reactive to light. Neck:      Vascular: No carotid bruit. Cardiovascular:      Rate and Rhythm: Normal rate and regular rhythm. Pulmonary:      Effort: Pulmonary effort is normal. No respiratory distress. Breath sounds: Normal breath sounds. Abdominal:      General: Bowel sounds are normal. There is no distension. Palpations: Abdomen is soft. Musculoskeletal:      Cervical back: Normal range of motion and neck supple. No rigidity or tenderness. Right lower leg: No edema. Left lower leg: No edema.    Lymphadenopathy:      Cervical: No cervical adenopathy. Skin:     General: Skin is warm and dry. Neurological:      Mental Status: She is alert and oriented to person, place, and time. Psychiatric:         Mood and Affect: Mood normal.         Behavior: Behavior normal.         Thought Content: Thought content normal.         Judgment: Judgment normal.         /82 (Site: Left Upper Arm, Position: Sitting, Cuff Size: Large Adult)   Pulse 81   Temp 97.5 °F (36.4 °C) (Temporal)   Resp 20   Ht 5' 1\" (1.549 m)   Wt 219 lb (99.3 kg)   SpO2 96%   BMI 41.38 kg/m²      ASSESSMENT:      ICD-10-CM    1. Mixed hyperlipidemia  E78.2 rosuvastatin (CRESTOR) 10 MG tablet     Urinalysis Reflex to Culture     CBC Auto Differential     Comprehensive Metabolic Panel w/ Reflex to MG     Lipid Panel     TSH WITH REFLEX TO FT4   2. Flu vaccine need  Z23 INFLUENZA, MDCK QUADV, 2 YRS AND OLDER, IM, PF, PREFILL SYR OR SDV, 0.5ML (FLUCELVAX QUADV, PF)   3. Gastroesophageal reflux disease without esophagitis  K21.9 esomeprazole (NEXIUM) 40 MG delayed release capsule     dilTIAZem (CARDIZEM) 60 MG tablet   4. Chronic right shoulder pain  M25.511 diclofenac (VOLTAREN) 75 MG EC tablet    G89.29    5. Stress incontinence  N39.3 oxybutynin (DITROPAN-XL) 10 MG extended release tablet     Urinalysis Reflex to Culture     CBC Auto Differential   6. Need for shingles vaccine  Z23 Zoster recombinant [facility])   7. Encounter for vitamin deficiency screening  Z13.21 Vitamin D 25 Hydroxy   8. Screening mammogram, encounter for  Z12.31 Mammography screening bilateral   9. Personal history of tobacco use  Z87.891 NE VISIT TO DISCUSS LUNG CA SCREEN W LDCT     CT Lung Screen (Annual)       PLAN:    Stephanie Molinae: Medication Refill (Patient presents for medication refills and will come back for lab work.)  CT screen schedule  Lab due  Flu and shingles vaccine today--pt wants both today and aware of side effects. Pap recommended for schedule.   Diagnosis and orders for this visit are above.      Please note that this chart was generated using dragon dictationsoftware. Although every effort was made to ensure the accuracy of this automated transcription, some errors in transcription may have occurred. Low Dose CT (LDCT) Lung Screening criteria met:     Age 55-77(Medicare) or 50-80 ([facility])   Pack year smoking >30 (Medicare) or >20 ([facility])   Still smoking or less than 15 year since quit   No sign or symptoms of lung cancer   > 11 months since last LDCT     Risks and benefits of lung cancer screening with LDCT scans discussed:    Significance of positive screen - False-positive LDCT results often occur. 95% of all positive results do not lead to a diagnosis of cancer. Usually further imaging can resolve most false-positive results; however, some patients may require invasive procedures. Over diagnosis risk - 10% to 12% of screen-detected lung cancer cases are over diagnosed--that is, the cancer would not have been detected in the patient's lifetime without the screening. Need for follow up screens annually to continue lung cancer screening effectiveness     Risks associated with radiation from annual LDCT- Radiation exposure is about the same as for a mammogram, which is about 1/3 of the annual background radiation exposure from everyday life. Starting screening at age 54 is not likely to increase cancer risk from radiation exposure. Patients with comorbidities resulting in life expectancy of < 10 years, or that would preclude treatment of an abnormality identified on CT, should not be screened due to lack of benefit.     To obtain maximal benefit from this screening, smoking cessation and long-term abstinence from smoking is critical

## 2022-01-06 ENCOUNTER — NURSE ONLY (OUTPATIENT)
Dept: PRIMARY CARE CLINIC | Age: 62
End: 2022-01-06
Payer: COMMERCIAL

## 2022-01-06 DIAGNOSIS — Z13.21 ENCOUNTER FOR VITAMIN DEFICIENCY SCREENING: ICD-10-CM

## 2022-01-06 DIAGNOSIS — Z23 NEED FOR SHINGLES VACCINE: Primary | ICD-10-CM

## 2022-01-06 DIAGNOSIS — N39.3 STRESS INCONTINENCE: ICD-10-CM

## 2022-01-06 DIAGNOSIS — E78.2 MIXED HYPERLIPIDEMIA: ICD-10-CM

## 2022-01-06 LAB
ALBUMIN SERPL-MCNC: 4.5 G/DL (ref 3.5–5.2)
ALP BLD-CCNC: 123 U/L (ref 35–104)
ALT SERPL-CCNC: 29 U/L (ref 5–33)
ANION GAP SERPL CALCULATED.3IONS-SCNC: 11 MMOL/L (ref 7–19)
AST SERPL-CCNC: 19 U/L (ref 5–32)
BACTERIA: NEGATIVE /HPF
BASOPHILS ABSOLUTE: 0.1 K/UL (ref 0–0.2)
BASOPHILS RELATIVE PERCENT: 1.7 % (ref 0–1)
BILIRUB SERPL-MCNC: 0.3 MG/DL (ref 0.2–1.2)
BILIRUBIN URINE: NEGATIVE
BLOOD, URINE: NEGATIVE
BUN BLDV-MCNC: 18 MG/DL (ref 6–20)
CALCIUM SERPL-MCNC: 9.8 MG/DL (ref 8.6–10)
CHLORIDE BLD-SCNC: 101 MMOL/L (ref 98–111)
CHOLESTEROL, TOTAL: 220 MG/DL (ref 160–199)
CLARITY: ABNORMAL
CO2: 29 MMOL/L (ref 22–29)
COLOR: YELLOW
CREAT SERPL-MCNC: 0.7 MG/DL (ref 0.5–0.9)
CRYSTALS, UA: ABNORMAL /HPF
EOSINOPHILS ABSOLUTE: 0.2 K/UL (ref 0–0.6)
EOSINOPHILS RELATIVE PERCENT: 3.7 % (ref 0–5)
EPITHELIAL CELLS, UA: 8 /HPF (ref 0–5)
GFR AFRICAN AMERICAN: >59
GFR NON-AFRICAN AMERICAN: >60
GLUCOSE BLD-MCNC: 97 MG/DL (ref 74–109)
GLUCOSE URINE: NEGATIVE MG/DL
HCT VFR BLD CALC: 52.3 % (ref 37–47)
HDLC SERPL-MCNC: 50 MG/DL (ref 65–121)
HEMOGLOBIN: 16 G/DL (ref 12–16)
HYALINE CASTS: 4 /HPF (ref 0–8)
IMMATURE GRANULOCYTES #: 0.1 K/UL
KETONES, URINE: NEGATIVE MG/DL
LDL CHOLESTEROL CALCULATED: 133 MG/DL
LEUKOCYTE ESTERASE, URINE: ABNORMAL
LYMPHOCYTES ABSOLUTE: 1.5 K/UL (ref 1.1–4.5)
LYMPHOCYTES RELATIVE PERCENT: 23.6 % (ref 20–40)
MCH RBC QN AUTO: 29 PG (ref 27–31)
MCHC RBC AUTO-ENTMCNC: 30.6 G/DL (ref 33–37)
MCV RBC AUTO: 94.9 FL (ref 81–99)
MONOCYTES ABSOLUTE: 0.5 K/UL (ref 0–0.9)
MONOCYTES RELATIVE PERCENT: 8.2 % (ref 0–10)
NEUTROPHILS ABSOLUTE: 4 K/UL (ref 1.5–7.5)
NEUTROPHILS RELATIVE PERCENT: 61.9 % (ref 50–65)
NITRITE, URINE: NEGATIVE
PDW BLD-RTO: 12.2 % (ref 11.5–14.5)
PH UA: 6 (ref 5–8)
PLATELET # BLD: 264 K/UL (ref 130–400)
PMV BLD AUTO: 10.5 FL (ref 9.4–12.3)
POTASSIUM REFLEX MAGNESIUM: 5.1 MMOL/L (ref 3.5–5)
PROTEIN UA: NEGATIVE MG/DL
RBC # BLD: 5.51 M/UL (ref 4.2–5.4)
RBC UA: 3 /HPF (ref 0–4)
SODIUM BLD-SCNC: 141 MMOL/L (ref 136–145)
SPECIFIC GRAVITY UA: 1.02 (ref 1–1.03)
TOTAL PROTEIN: 6.7 G/DL (ref 6.6–8.7)
TRIGL SERPL-MCNC: 183 MG/DL (ref 0–149)
TSH REFLEX FT4: 1.98 UIU/ML (ref 0.35–5.5)
UROBILINOGEN, URINE: 0.2 E.U./DL
VITAMIN D 25-HYDROXY: 98.7 NG/ML
WBC # BLD: 6.5 K/UL (ref 4.8–10.8)
WBC UA: 13 /HPF (ref 0–5)

## 2022-01-06 PROCEDURE — 90750 HZV VACC RECOMBINANT IM: CPT | Performed by: NURSE PRACTITIONER

## 2022-01-06 PROCEDURE — 90471 IMMUNIZATION ADMIN: CPT | Performed by: NURSE PRACTITIONER

## 2022-01-06 NOTE — PROGRESS NOTES
After obtaining consent from [address], gave patient shingrix injection in Right deltoid, patient tolerated well. Medication was not supplied by the patient.

## 2022-01-08 LAB — URINE CULTURE, ROUTINE: NORMAL

## 2022-01-24 ASSESSMENT — ENCOUNTER SYMPTOMS: RESPIRATORY NEGATIVE: 1

## 2022-02-16 DIAGNOSIS — Z87.891 PERSONAL HISTORY OF TOBACCO USE: ICD-10-CM

## 2022-02-16 DIAGNOSIS — E78.2 MIXED HYPERLIPIDEMIA: Primary | ICD-10-CM

## 2022-07-10 DIAGNOSIS — M25.511 CHRONIC RIGHT SHOULDER PAIN: ICD-10-CM

## 2022-07-10 DIAGNOSIS — G89.29 CHRONIC RIGHT SHOULDER PAIN: ICD-10-CM

## 2022-07-10 DIAGNOSIS — E78.2 MIXED HYPERLIPIDEMIA: ICD-10-CM

## 2022-07-11 RX ORDER — DICLOFENAC SODIUM 75 MG/1
TABLET, DELAYED RELEASE ORAL
Qty: 180 TABLET | Refills: 1 | Status: SHIPPED | OUTPATIENT
Start: 2022-07-11

## 2022-07-11 RX ORDER — ROSUVASTATIN CALCIUM 10 MG/1
TABLET, COATED ORAL
Qty: 90 TABLET | Refills: 1 | Status: SHIPPED | OUTPATIENT
Start: 2022-07-11

## 2022-10-10 DIAGNOSIS — K21.9 GASTROESOPHAGEAL REFLUX DISEASE WITHOUT ESOPHAGITIS: ICD-10-CM

## 2022-10-10 NOTE — TELEPHONE ENCOUNTER
Joanna Marley called to request a refill on her medication.       Last office visit : 1/5/2022   Next office visit : Visit date not found     Requested Prescriptions     Pending Prescriptions Disp Refills    dilTIAZem (CARDIZEM) 60 MG tablet 360 tablet 1     Sig: TAKE 1 TABLET BY MOUTH FOUR TIMES A DAY            Patricia Ferrari CMA

## 2022-10-12 RX ORDER — DILTIAZEM HYDROCHLORIDE 60 MG/1
TABLET, FILM COATED ORAL
Qty: 360 TABLET | Refills: 1 | Status: SHIPPED | OUTPATIENT
Start: 2022-10-12

## 2022-11-28 DIAGNOSIS — K21.9 GASTROESOPHAGEAL REFLUX DISEASE WITHOUT ESOPHAGITIS: ICD-10-CM

## 2022-11-29 NOTE — TELEPHONE ENCOUNTER
Jeff Gupta called to request a refill on her medication.       Last office visit : 1/5/2022   Next office visit : Visit date not found     Requested Prescriptions     Pending Prescriptions Disp Refills    esomeprazole (Dynex) 40 MG delayed release capsule [Pharmacy Med Name: ESOMEPRAZOLE MAG DR 40 MG CAP] 180 capsule 1     Sig: TAKE 1 CAPSULE BY MOUTH TWICE A DAY            Isamar Monroy

## 2022-11-30 RX ORDER — ESOMEPRAZOLE MAGNESIUM 40 MG/1
CAPSULE, DELAYED RELEASE ORAL
Qty: 180 CAPSULE | Refills: 1 | Status: SHIPPED | OUTPATIENT
Start: 2022-11-30

## 2022-12-05 DIAGNOSIS — K21.9 GASTROESOPHAGEAL REFLUX DISEASE WITHOUT ESOPHAGITIS: ICD-10-CM

## 2022-12-05 RX ORDER — DILTIAZEM HYDROCHLORIDE 60 MG/1
TABLET, FILM COATED ORAL
Qty: 360 TABLET | Refills: 1 | Status: SHIPPED | OUTPATIENT
Start: 2022-12-05

## 2022-12-05 NOTE — TELEPHONE ENCOUNTER
Jerrell Enid called to request a refill on her medication.       Last office visit : 1/5/2022   Next office visit : Visit date not found     Requested Prescriptions     Pending Prescriptions Disp Refills    dilTIAZem (CARDIZEM) 60 MG tablet 360 tablet 1     Sig: TAKE 1 TABLET BY MOUTH FOUR TIMES A DAY            Zach Camarillo CMA

## 2023-02-22 DIAGNOSIS — E78.2 MIXED HYPERLIPIDEMIA: ICD-10-CM

## 2023-02-22 RX ORDER — ROSUVASTATIN CALCIUM 10 MG/1
TABLET, COATED ORAL
Qty: 90 TABLET | Refills: 1 | Status: SHIPPED | OUTPATIENT
Start: 2023-02-22

## 2023-02-22 NOTE — TELEPHONE ENCOUNTER
Su Brice called to request a refill on her medication.      Last office visit : 1/5/2022   Next office visit : 2/24/2023     Requested Prescriptions     Pending Prescriptions Disp Refills    rosuvastatin (CRESTOR) 10 MG tablet [Pharmacy Med Name: ROSUVASTATIN CALCIUM 10 MG TAB] 90 tablet 1     Sig: TAKE 1 TABLET BY MOUTH NIGHTLY FOR CHOLESTEROL            Hawa Larkin MA

## 2023-03-01 ENCOUNTER — OFFICE VISIT (OUTPATIENT)
Dept: FAMILY MEDICINE CLINIC | Age: 63
End: 2023-03-01
Payer: COMMERCIAL

## 2023-03-01 VITALS
OXYGEN SATURATION: 95 % | WEIGHT: 219.5 LBS | SYSTOLIC BLOOD PRESSURE: 122 MMHG | HEART RATE: 78 BPM | DIASTOLIC BLOOD PRESSURE: 78 MMHG | BODY MASS INDEX: 41.44 KG/M2 | HEIGHT: 61 IN | TEMPERATURE: 97.4 F

## 2023-03-01 DIAGNOSIS — K21.9 GASTROESOPHAGEAL REFLUX DISEASE WITHOUT ESOPHAGITIS: ICD-10-CM

## 2023-03-01 DIAGNOSIS — Z87.891 PERSONAL HISTORY OF TOBACCO USE: ICD-10-CM

## 2023-03-01 DIAGNOSIS — E78.2 MIXED HYPERLIPIDEMIA: Primary | ICD-10-CM

## 2023-03-01 DIAGNOSIS — Z12.11 SCREEN FOR COLON CANCER: ICD-10-CM

## 2023-03-01 DIAGNOSIS — Z12.31 SCREENING MAMMOGRAM, ENCOUNTER FOR: ICD-10-CM

## 2023-03-01 DIAGNOSIS — B07.9 VIRAL WARTS, UNSPECIFIED TYPE: ICD-10-CM

## 2023-03-01 PROCEDURE — G0296 VISIT TO DETERM LDCT ELIG: HCPCS | Performed by: NURSE PRACTITIONER

## 2023-03-01 PROCEDURE — 99214 OFFICE O/P EST MOD 30 MIN: CPT | Performed by: NURSE PRACTITIONER

## 2023-03-01 RX ORDER — ROSUVASTATIN CALCIUM 10 MG/1
TABLET, COATED ORAL
Qty: 90 TABLET | Refills: 1 | Status: SHIPPED | OUTPATIENT
Start: 2023-03-01

## 2023-03-01 RX ORDER — DILTIAZEM HYDROCHLORIDE 60 MG/1
TABLET, FILM COATED ORAL
Qty: 360 TABLET | Refills: 1 | Status: SHIPPED | OUTPATIENT
Start: 2023-03-01

## 2023-03-01 RX ORDER — ESOMEPRAZOLE MAGNESIUM 40 MG/1
CAPSULE, DELAYED RELEASE ORAL
Qty: 180 CAPSULE | Refills: 1 | Status: SHIPPED | OUTPATIENT
Start: 2023-03-01

## 2023-03-01 ASSESSMENT — ENCOUNTER SYMPTOMS: RESPIRATORY NEGATIVE: 1

## 2023-03-01 ASSESSMENT — PATIENT HEALTH QUESTIONNAIRE - PHQ9
SUM OF ALL RESPONSES TO PHQ QUESTIONS 1-9: 0
SUM OF ALL RESPONSES TO PHQ QUESTIONS 1-9: 0
2. FEELING DOWN, DEPRESSED OR HOPELESS: 0
1. LITTLE INTEREST OR PLEASURE IN DOING THINGS: 0
SUM OF ALL RESPONSES TO PHQ QUESTIONS 1-9: 0
SUM OF ALL RESPONSES TO PHQ9 QUESTIONS 1 & 2: 0
SUM OF ALL RESPONSES TO PHQ QUESTIONS 1-9: 0

## 2023-03-01 NOTE — PATIENT INSTRUCTIONS

## 2023-03-01 NOTE — PROGRESS NOTES
SUBJECTIVE:    Patient ID: Jose Amos is a62 y.o. female. Jose Amos is here today for Check-Up, Medication Refill, and Other (Warts on hands)  . HPI:   HPI     Pt is here today for check up and medication refills. She has gerd and is well controlled with bid PPI. She has been eval by GI Hildreth Dubin) and this plan was confirmed there. Patient also has hyperlipidemia but has been taking rosuvastatin 10 mg nightly. She is not fasting at this time but will return for fasting lab work. She does not always adhere to a low-cholesterol diet. She also continues to have bursitis pain continues to use diclofenac as needed. She does have her orthopedic surgeon that she follows up with regularly. She also notes having warts on 2 of her right fingers. She states they have been there for several years and are very tender and she is ready to have them removed. She is very proud as she should be, she has stopped smoking in July of last year. She is due for her CT annual lung screening however. Past Medical History:   Diagnosis Date    Bright's disease     COPD (chronic obstructive pulmonary disease) ([facility] [address]     Degenerative disorder of bone     GERD (gastroesophageal reflux disease)     Hyperlipidemia     Long's toe     Ulcer      Prior to Visit Medications    Medication Sig Taking?  Authorizing Provider   rosuvastatin (CRESTOR) 10 MG tablet 1 po nightly for cholesterol Yes LUCI Dubon   dilTIAZem (CARDIZEM) 60 MG tablet TAKE 1 TABLET BY MOUTH FOUR TIMES A DAY Yes LUCI Dubon   esomeprazole (NEXIUM) 40 MG delayed release capsule 1 po po bid before meals Yes LUCI Dubon   diclofenac (VOLTAREN) 75 MG EC tablet TAKE 1 TABLET BY MOUTH TWICE A DAY Yes LUCI Dubon   oxybutynin (DITROPAN-XL) 10 MG extended release tablet TAKE 1 TABLET BY MOUTH EVERY DAY Yes LUCI Dubon   Cyanocobalamin (VITAMIN B 12 PO) Take by mouth Yes Historical Provider, MD   LORATADINE ALLERGY RELIEF PO Take by mouth Yes Historical Provider, MD   Cholecalciferol (VITAMIN D3) 5000 UNITS CAPS Take by mouth Patient takes 3 daily Yes Historical Provider, MD   Polyethylene Glycol 3350 (MIRALAX PO) Take by mouth Yes Historical Provider, MD     No Known Allergies  Past Surgical History:   Procedure Laterality Date    ABDOMINAL EXPLORATION SURGERY      CHOLECYSTECTOMY      EYE SURGERY      FINGER SURGERY      GALLBLADDER SURGERY       History reviewed. No pertinent family history. Social History     Socioeconomic History    Marital status:      Spouse name: Not on file    Number of children: Not on file    Years of education: Not on file    Highest education level: Not on file   Occupational History    Not on file   Tobacco Use    Smoking status: Former     Packs/day: 0.75     Years: 40.00     Pack years: 30.00     Types: Cigarettes     Start date: 1978     Quit date: 2022     Years since quittin.6    Smokeless tobacco: Never   Substance and Sexual Activity    Alcohol use: No    Drug use: No    Sexual activity: Not on file   Other Topics Concern    Not on file   Social History Narrative    Not on file     Social Determinants of Health     Financial Resource Strain: Not on file   Food Insecurity: Not on file   Transportation Needs: Not on file   Physical Activity: Not on file   Stress: Not on file   Social Connections: Not on file   Intimate Partner Violence: Not on file   Housing Stability: Not on file       Review of Systems   Constitutional:  Negative for unexpected weight change. HENT: Negative. Respiratory: Negative. Cardiovascular: Negative. Skin:         Warts-right hand fingertips   Neurological: Negative. Psychiatric/Behavioral: Negative. OBJECTIVE:    Physical Exam  Vitals and nursing note reviewed. Constitutional:       General: She is not in acute distress. Appearance: Normal appearance. She is well-developed. She is obese.    HENT:      Head: Normocephalic and atraumatic. Right Ear: Tympanic membrane and external ear normal. There is no impacted cerumen. Left Ear: Tympanic membrane and external ear normal. There is no impacted cerumen. Nose: Nose normal. No congestion. Mouth/Throat:      Mouth: Mucous membranes are moist.      Pharynx: No oropharyngeal exudate or posterior oropharyngeal erythema. Eyes:      Extraocular Movements: Extraocular movements intact. Conjunctiva/sclera: Conjunctivae normal.      Pupils: Pupils are equal, round, and reactive to light. Neck:      Vascular: No carotid bruit. Cardiovascular:      Rate and Rhythm: Normal rate and regular rhythm. Heart sounds: Normal heart sounds. No murmur heard. Pulmonary:      Effort: Pulmonary effort is normal. No respiratory distress. Breath sounds: Normal breath sounds. Abdominal:      General: There is no distension. Palpations: Abdomen is soft. Tenderness: There is no abdominal tenderness. Musculoskeletal:      Cervical back: Neck supple. Right lower leg: No edema. Left lower leg: No edema. Skin:     General: Skin is warm and dry. Capillary Refill: Capillary refill takes less than 2 seconds. Neurological:      General: No focal deficit present. Mental Status: She is alert and oriented to person, place, and time. Psychiatric:         Mood and Affect: Mood normal.         Behavior: Behavior normal.         Thought Content: Thought content normal.         Judgment: Judgment normal.       /78 (Site: Right Lower Arm, Position: Sitting, Cuff Size: Medium Adult)   Pulse 78   Temp 97.4 °F (36.3 °C) (Temporal)   Ht 5' 1\" (1.549 m)   Wt 219 lb 8 oz (99.6 kg)   SpO2 95%   BMI 41.47 kg/m²      ASSESSMENT:      ICD-10-CM    1.  Mixed hyperlipidemia  E78.2 rosuvastatin (CRESTOR) 10 MG tablet     Urinalysis     CBC with Auto Differential     Comprehensive Metabolic Panel w/ Reflex to MG     Lipid Panel     TSH with Reflex to FT4 2. Gastroesophageal reflux disease without esophagitis  K21.9 dilTIAZem (CARDIZEM) 60 MG tablet     esomeprazole (NEXIUM) 40 MG delayed release capsule     Urinalysis     CBC with Auto Differential      3. Screening mammogram, encounter for  Z12.31 Mammography screening bilateral      4. Personal history of tobacco use  Z87.891 TN VISIT TO DISCUSS LUNG CA SCREEN W LDCT     CT Lung Screen (Annual/Baseline)      5. Screen for colon cancer  Z12.11 Cologuard (Fecal DNA Colorectal Cancer Screening)      6. Viral warts, unspecified type -fingers B07.9 External Referral To Dermatology          PLAN:    Fatou Grills: Check-Up, Medication Refill, and Other (Warts on hands)  Pap recommended  Fasting lab  Shingrix info sheet  Plan as above  F/u ideally in 6mo and pending lab and testing results. Diagnosis and orders for this visit are above. Please note that this chart was generated using dragon dictationsoftware. Although every effort was made to ensure the accuracy of this automated transcription, some errors in transcription may have occurred. Discussed with the patient the current USPSTF guidelines released March 9, 2021 for screening for lung cancer. For adults aged 48 to [de-identified] years who have a 20 pack-year smoking history and currently smoke or have quit within the past 15 years the grade B recommendation is to:  Screen for lung cancer with low-dose computed tomography (LDCT) every year. Stop screening once a person has not smoked for 15 years or has a health problem that limits life expectancy or the ability to have lung surgery. The patient  reports that she quit smoking about 7 months ago. Her smoking use included cigarettes. She started smoking about 44 years ago. She has a 30.00 pack-year smoking history. She has never used smokeless tobacco.. Discussed with patient the risks and benefits of screening, including over-diagnosis, false positive rate, and total radiation exposure.   The patient currently exhibits no signs or symptoms suggestive of lung cancer. Discussed with patient the importance of compliance with yearly annual lung cancer screenings and willingness to undergo diagnosis and treatment if screening scan is positive. In addition, the patient was counseled regarding the importance of remaining smoke free and/or total smoking cessation.     Also reviewed the following if the patient has Medicare that as of February 10, 2022, Medicare only covers LDCT screening in patients aged 51-72 with at least a 20 pack-year smoking history who currently smoke or have quit in the last 15 years

## 2023-03-14 DIAGNOSIS — Z12.31 SCREENING MAMMOGRAM, ENCOUNTER FOR: ICD-10-CM

## 2023-03-20 LAB — NONINV COLON CA DNA+OCC BLD SCRN STL QL: NEGATIVE

## 2023-04-04 ENCOUNTER — TELEPHONE (OUTPATIENT)
Dept: FAMILY MEDICINE CLINIC | Age: 63
End: 2023-04-04

## 2023-04-04 NOTE — TELEPHONE ENCOUNTER
----- Message from LUCI Gupta sent at 3/29/2023 10:47 AM CDT -----  Mammogram is normal.  Continue every month self breast exam and yearly mammos.

## 2023-05-23 DIAGNOSIS — E78.2 MIXED HYPERLIPIDEMIA: ICD-10-CM

## 2023-05-23 DIAGNOSIS — K21.9 GASTROESOPHAGEAL REFLUX DISEASE WITHOUT ESOPHAGITIS: ICD-10-CM

## 2023-05-23 LAB
ALBUMIN SERPL-MCNC: 4 G/DL (ref 3.5–5.2)
ALP SERPL-CCNC: 119 U/L (ref 35–104)
ALT SERPL-CCNC: 21 U/L (ref 5–33)
ANION GAP SERPL CALCULATED.3IONS-SCNC: 9 MMOL/L (ref 7–19)
AST SERPL-CCNC: 16 U/L (ref 5–32)
BASOPHILS # BLD: 0.1 K/UL (ref 0–0.2)
BASOPHILS NFR BLD: 1.6 % (ref 0–1)
BILIRUB SERPL-MCNC: <0.2 MG/DL (ref 0.2–1.2)
BILIRUB UR QL STRIP: NEGATIVE
BUN SERPL-MCNC: 16 MG/DL (ref 8–23)
CALCIUM SERPL-MCNC: 9.4 MG/DL (ref 8.8–10.2)
CHLORIDE SERPL-SCNC: 102 MMOL/L (ref 98–111)
CHOLEST SERPL-MCNC: 164 MG/DL (ref 160–199)
CLARITY UR: ABNORMAL
CO2 SERPL-SCNC: 31 MMOL/L (ref 22–29)
COLOR UR: YELLOW
CREAT SERPL-MCNC: 0.8 MG/DL (ref 0.5–0.9)
EOSINOPHIL # BLD: 0.3 K/UL (ref 0–0.6)
EOSINOPHIL NFR BLD: 5.5 % (ref 0–5)
ERYTHROCYTE [DISTWIDTH] IN BLOOD BY AUTOMATED COUNT: 12.1 % (ref 11.5–14.5)
GLUCOSE SERPL-MCNC: 100 MG/DL (ref 74–109)
GLUCOSE UR STRIP.AUTO-MCNC: NEGATIVE MG/DL
HCT VFR BLD AUTO: 44.6 % (ref 37–47)
HDLC SERPL-MCNC: 42 MG/DL (ref 65–121)
HGB BLD-MCNC: 14.1 G/DL (ref 12–16)
HGB UR STRIP.AUTO-MCNC: NEGATIVE MG/L
IMM GRANULOCYTES # BLD: 0.1 K/UL
KETONES UR STRIP.AUTO-MCNC: NEGATIVE MG/DL
LDLC SERPL CALC-MCNC: 74 MG/DL
LEUKOCYTE ESTERASE UR QL STRIP.AUTO: NEGATIVE
LYMPHOCYTES # BLD: 1.4 K/UL (ref 1.1–4.5)
LYMPHOCYTES NFR BLD: 25.4 % (ref 20–40)
MCH RBC QN AUTO: 29.5 PG (ref 27–31)
MCHC RBC AUTO-ENTMCNC: 31.6 G/DL (ref 33–37)
MCV RBC AUTO: 93.3 FL (ref 81–99)
MONOCYTES # BLD: 0.5 K/UL (ref 0–0.9)
MONOCYTES NFR BLD: 9.6 % (ref 0–10)
NEUTROPHILS # BLD: 3.2 K/UL (ref 1.5–7.5)
NEUTS SEG NFR BLD: 56.7 % (ref 50–65)
NITRITE UR QL STRIP.AUTO: NEGATIVE
PH UR STRIP.AUTO: 5.5 [PH] (ref 5–8)
PLATELET # BLD AUTO: 282 K/UL (ref 130–400)
PMV BLD AUTO: 10.3 FL (ref 9.4–12.3)
POTASSIUM SERPL-SCNC: 4.4 MMOL/L (ref 3.5–5)
PROT SERPL-MCNC: 6.3 G/DL (ref 6.6–8.7)
PROT UR STRIP.AUTO-MCNC: NEGATIVE MG/DL
RBC # BLD AUTO: 4.78 M/UL (ref 4.2–5.4)
SODIUM SERPL-SCNC: 142 MMOL/L (ref 136–145)
SP GR UR STRIP.AUTO: 1.02 (ref 1–1.03)
TRIGL SERPL-MCNC: 238 MG/DL (ref 0–149)
TSH SERPL DL<=0.005 MIU/L-ACNC: 1.53 UIU/ML (ref 0.35–5.5)
UROBILINOGEN UR STRIP.AUTO-MCNC: 0.2 E.U./DL
WBC # BLD AUTO: 5.6 K/UL (ref 4.8–10.8)

## 2023-06-01 DIAGNOSIS — G89.29 CHRONIC RIGHT SHOULDER PAIN: ICD-10-CM

## 2023-06-01 DIAGNOSIS — M25.511 CHRONIC RIGHT SHOULDER PAIN: ICD-10-CM

## 2023-06-01 RX ORDER — DICLOFENAC SODIUM 75 MG/1
TABLET, DELAYED RELEASE ORAL
Qty: 180 TABLET | Refills: 1 | Status: SHIPPED | OUTPATIENT
Start: 2023-06-01

## 2023-06-01 NOTE — TELEPHONE ENCOUNTER
Haydee Fang called to request a refill on her medication.       Last office visit : 3/1/2023   Next office visit : Visit date not found     Requested Prescriptions     Pending Prescriptions Disp Refills    diclofenac (VOLTAREN) 75 MG EC tablet [Pharmacy Med Name: DICLOFENAC SOD EC 75 MG TAB] 180 tablet 1     Sig: TAKE 1 TABLET BY MOUTH TWICE A DAY            Edwin Jesus, [address]

## 2023-06-22 ENCOUNTER — OFFICE VISIT (OUTPATIENT)
Dept: FAMILY MEDICINE CLINIC | Age: 63
End: 2023-06-22
Payer: COMMERCIAL

## 2023-06-22 VITALS
DIASTOLIC BLOOD PRESSURE: 78 MMHG | TEMPERATURE: 97.2 F | HEART RATE: 69 BPM | SYSTOLIC BLOOD PRESSURE: 124 MMHG | BODY MASS INDEX: 43.23 KG/M2 | WEIGHT: 229 LBS | HEIGHT: 61 IN | RESPIRATION RATE: 18 BRPM | OXYGEN SATURATION: 97 %

## 2023-06-22 DIAGNOSIS — B36.9 FUNGAL SKIN INFECTION: Primary | ICD-10-CM

## 2023-06-22 PROCEDURE — 99213 OFFICE O/P EST LOW 20 MIN: CPT | Performed by: FAMILY MEDICINE

## 2023-06-22 RX ORDER — KETOCONAZOLE 20 MG/G
CREAM TOPICAL
Qty: 30 G | Refills: 1 | Status: SHIPPED | OUTPATIENT
Start: 2023-06-22

## 2023-06-22 ASSESSMENT — ENCOUNTER SYMPTOMS
EYE DISCHARGE: 0
EYE PAIN: 0
SHORTNESS OF BREATH: 0
ABDOMINAL PAIN: 0
SORE THROAT: 0
RHINORRHEA: 0
CONSTIPATION: 0
COUGH: 0
DIARRHEA: 0
VOMITING: 0
NAUSEA: 0

## 2023-06-22 NOTE — PROGRESS NOTES
Mirtha PYLE [facility]  [address] [address]  Dept: [phone]  Dept Fax: [phone]    Visit type: Established patient    Reason for Visit: Rash (Red rash on neck and chest x3 days. )         Assessment and Plan       1. Fungal skin infection  -     ketoconazole (NIZORAL) 2 % cream; Apply topically daily. , Disp-30 g, R-1, Normal    Discussed diagnosis, expected course, and proper use of medication, including OTC medications if prescription is too expensive or insurance does not cover. Discussed signs and symptoms requiring medical attention. All questions were answered and patient voiced understanding and agreement with plan as discussed. Return if symptoms worsen or fail to improve, for next scheduled follow up with PCP. Subjective       HPI   Patient reports that she has been having a rash that has been ongoing for a few days. She states that it really doesn't itch or hurt. She denies any new products or other changes. Review of Systems   Constitutional:  Negative for activity change, appetite change and fever. HENT:  Negative for congestion, rhinorrhea and sore throat. Eyes:  Negative for pain and discharge. Respiratory:  Negative for cough and shortness of breath. Cardiovascular:  Negative for chest pain and palpitations. Gastrointestinal:  Negative for abdominal pain, constipation, diarrhea, nausea and vomiting. Endocrine: Negative for cold intolerance and heat intolerance. Genitourinary:  Negative for dysuria and hematuria. Musculoskeletal:  Negative for gait problem and neck pain. Skin:  Positive for rash. Negative for wound.       No Known Allergies    Outpatient Medications Prior to Visit   Medication Sig Dispense Refill    rosuvastatin (CRESTOR) 10 MG tablet 1 po nightly for cholesterol 90 tablet 1    dilTIAZem (CARDIZEM) 60 MG tablet TAKE 1 TABLET BY MOUTH FOUR TIMES A  tablet 1    esomeprazole (NEXIUM) 40 MG delayed release capsule 1 po po

## 2023-06-30 ENCOUNTER — OFFICE VISIT (OUTPATIENT)
Dept: FAMILY MEDICINE CLINIC | Age: 63
End: 2023-06-30
Payer: COMMERCIAL

## 2023-06-30 VITALS
OXYGEN SATURATION: 94 % | TEMPERATURE: 97.1 F | BODY MASS INDEX: 41.94 KG/M2 | HEIGHT: 61 IN | HEART RATE: 84 BPM | DIASTOLIC BLOOD PRESSURE: 78 MMHG | SYSTOLIC BLOOD PRESSURE: 126 MMHG | WEIGHT: 222.13 LBS

## 2023-06-30 DIAGNOSIS — B36.0 TINEA VERSICOLOR: Primary | ICD-10-CM

## 2023-06-30 PROCEDURE — 99213 OFFICE O/P EST LOW 20 MIN: CPT | Performed by: NURSE PRACTITIONER

## 2023-06-30 RX ORDER — ITRACONAZOLE 100 MG/1
200 CAPSULE ORAL DAILY
Qty: 14 CAPSULE | Refills: 0 | Status: SHIPPED | OUTPATIENT
Start: 2023-06-30 | End: 2023-07-07

## 2023-06-30 RX ORDER — KETOCONAZOLE 20 MG/ML
SHAMPOO TOPICAL
Qty: 1 EACH | Refills: 0 | Status: SHIPPED | OUTPATIENT
Start: 2023-06-30

## 2023-06-30 SDOH — ECONOMIC STABILITY: FOOD INSECURITY: WITHIN THE PAST 12 MONTHS, YOU WORRIED THAT YOUR FOOD WOULD RUN OUT BEFORE YOU GOT MONEY TO BUY MORE.: NEVER TRUE

## 2023-06-30 SDOH — ECONOMIC STABILITY: HOUSING INSECURITY
IN THE LAST 12 MONTHS, WAS THERE A TIME WHEN YOU DID NOT HAVE A STEADY PLACE TO SLEEP OR SLEPT IN A SHELTER (INCLUDING NOW)?: NO

## 2023-06-30 SDOH — ECONOMIC STABILITY: FOOD INSECURITY: WITHIN THE PAST 12 MONTHS, THE FOOD YOU BOUGHT JUST DIDN'T LAST AND YOU DIDN'T HAVE MONEY TO GET MORE.: NEVER TRUE

## 2023-06-30 SDOH — ECONOMIC STABILITY: INCOME INSECURITY: HOW HARD IS IT FOR YOU TO PAY FOR THE VERY BASICS LIKE FOOD, HOUSING, MEDICAL CARE, AND HEATING?: NOT HARD AT ALL

## 2023-06-30 ASSESSMENT — ENCOUNTER SYMPTOMS: RESPIRATORY NEGATIVE: 1

## 2023-07-27 DIAGNOSIS — B36.0 TINEA VERSICOLOR: ICD-10-CM

## 2023-07-27 NOTE — TELEPHONE ENCOUNTER
Last OV 6/30/2023  Next OV Visit date not found      Requested Prescriptions     Pending Prescriptions Disp Refills    ketoconazole (NIZORAL) 2 % shampoo [Pharmacy Med Name: KETOCONAZOLE 2% SHAMPOO] 120 mL      Sig: APPLY NIGHTLY X 7NIGHTS AS BODY SOAP AND LEAVE ON SKIN FOR 2MINS OR LONGER AND THEN RINSE.

## 2023-07-28 RX ORDER — KETOCONAZOLE 20 MG/ML
SHAMPOO TOPICAL
Qty: 120 ML | Refills: 1 | Status: SHIPPED | OUTPATIENT
Start: 2023-07-28

## 2023-09-25 ENCOUNTER — OFFICE VISIT (OUTPATIENT)
Dept: FAMILY MEDICINE CLINIC | Age: 63
End: 2023-09-25
Payer: COMMERCIAL

## 2023-09-25 VITALS
OXYGEN SATURATION: 97 % | SYSTOLIC BLOOD PRESSURE: 110 MMHG | HEART RATE: 78 BPM | DIASTOLIC BLOOD PRESSURE: 68 MMHG | HEIGHT: 61 IN | RESPIRATION RATE: 20 BRPM | WEIGHT: 229 LBS | TEMPERATURE: 97.7 F | BODY MASS INDEX: 43.23 KG/M2

## 2023-09-25 DIAGNOSIS — E78.2 MIXED HYPERLIPIDEMIA: ICD-10-CM

## 2023-09-25 DIAGNOSIS — H91.93 BILATERAL CHANGE IN HEARING: Primary | ICD-10-CM

## 2023-09-25 LAB
25(OH)D3 SERPL-MCNC: 95.6 NG/ML
ALBUMIN SERPL-MCNC: 4.1 G/DL (ref 3.5–5.2)
ALP SERPL-CCNC: 102 U/L (ref 35–104)
ALT SERPL-CCNC: 21 U/L (ref 5–33)
ANION GAP SERPL CALCULATED.3IONS-SCNC: 11 MMOL/L (ref 7–19)
AST SERPL-CCNC: 18 U/L (ref 5–32)
BASOPHILS # BLD: 0.1 K/UL (ref 0–0.2)
BASOPHILS NFR BLD: 0.8 % (ref 0–1)
BILIRUB SERPL-MCNC: 0.3 MG/DL (ref 0.2–1.2)
BUN SERPL-MCNC: 12 MG/DL (ref 8–23)
CALCIUM SERPL-MCNC: 9.5 MG/DL (ref 8.8–10.2)
CHLORIDE SERPL-SCNC: 101 MMOL/L (ref 98–111)
CHOLEST SERPL-MCNC: 227 MG/DL (ref 160–199)
CO2 SERPL-SCNC: 27 MMOL/L (ref 22–29)
CREAT SERPL-MCNC: 0.7 MG/DL (ref 0.5–0.9)
EOSINOPHIL # BLD: 0.2 K/UL (ref 0–0.6)
EOSINOPHIL NFR BLD: 2.5 % (ref 0–5)
ERYTHROCYTE [DISTWIDTH] IN BLOOD BY AUTOMATED COUNT: 12 % (ref 11.5–14.5)
GLUCOSE SERPL-MCNC: 112 MG/DL (ref 74–109)
HCT VFR BLD AUTO: 48.5 % (ref 37–47)
HDLC SERPL-MCNC: 48 MG/DL (ref 65–121)
HGB BLD-MCNC: 15.7 G/DL (ref 12–16)
IMM GRANULOCYTES # BLD: 0.1 K/UL
LDLC SERPL CALC-MCNC: 131 MG/DL
LYMPHOCYTES # BLD: 1.6 K/UL (ref 1.1–4.5)
LYMPHOCYTES NFR BLD: 22.4 % (ref 20–40)
MCH RBC QN AUTO: 29.7 PG (ref 27–31)
MCHC RBC AUTO-ENTMCNC: 32.4 G/DL (ref 33–37)
MCV RBC AUTO: 91.7 FL (ref 81–99)
MONOCYTES # BLD: 0.5 K/UL (ref 0–0.9)
MONOCYTES NFR BLD: 7.1 % (ref 0–10)
NEUTROPHILS # BLD: 4.9 K/UL (ref 1.5–7.5)
NEUTS SEG NFR BLD: 66.5 % (ref 50–65)
PLATELET # BLD AUTO: 276 K/UL (ref 130–400)
PMV BLD AUTO: 10.2 FL (ref 9.4–12.3)
POTASSIUM SERPL-SCNC: 4.3 MMOL/L (ref 3.5–5)
PROT SERPL-MCNC: 6.9 G/DL (ref 6.6–8.7)
RBC # BLD AUTO: 5.29 M/UL (ref 4.2–5.4)
SODIUM SERPL-SCNC: 139 MMOL/L (ref 136–145)
TRIGL SERPL-MCNC: 239 MG/DL (ref 0–149)
TSH SERPL DL<=0.005 MIU/L-ACNC: 0.93 UIU/ML (ref 0.35–5.5)
WBC # BLD AUTO: 7.3 K/UL (ref 4.8–10.8)

## 2023-09-25 PROCEDURE — 99214 OFFICE O/P EST MOD 30 MIN: CPT | Performed by: NURSE PRACTITIONER

## 2023-09-25 ASSESSMENT — ENCOUNTER SYMPTOMS: RESPIRATORY NEGATIVE: 1

## 2023-09-26 ENCOUNTER — TELEPHONE (OUTPATIENT)
Dept: ADMINISTRATIVE | Age: 63
End: 2023-09-26

## 2023-09-26 NOTE — TELEPHONE ENCOUNTER
Pt called back appt,, I attempted to make appt , however security would not allow the auditory testing, thanks Please call her mobile number

## 2023-10-02 ENCOUNTER — TELEPHONE (OUTPATIENT)
Dept: FAMILY MEDICINE CLINIC | Age: 63
End: 2023-10-02

## 2023-10-02 DIAGNOSIS — R73.01 IMPAIRED FASTING GLUCOSE: Primary | ICD-10-CM

## 2023-10-02 NOTE — TELEPHONE ENCOUNTER
Patient was advised of results. Patient states that her brother  last month and she was not watching what she eats. She states that she was taking her medication correctly. The lab is unable to add the Hemoglobin A1c so the patient will come back to the lab. Also the patient reports that she isn't able to get wegovy at the pharmacy due to a nationwide back order and wants to know what else she can take. \"Cholesterol levels have significantly increased since 4 months ago. If she adhering to treatment? If so I will likely need to increase dose of medication. Blood sugar has come in just slightly outside normal range. She needs to be working very hard on low-cholesterol dietary intake as well as low sugar and starchy foods/carbohydrate intake. All other lab is healthy at this time. If lab can go ahead and add a hemoglobin A1c related to impaired fasting glucose, that would be great. If they are unable to do so, please enter hemoglobin A1c related to impaired fasting glucose and she can do that at her convenience. \"

## 2023-10-04 ENCOUNTER — PROCEDURE VISIT (OUTPATIENT)
Dept: ENT CLINIC | Age: 63
End: 2023-10-04
Payer: COMMERCIAL

## 2023-10-04 DIAGNOSIS — H90.3 SENSORINEURAL HEARING LOSS (SNHL) OF BOTH EARS: Primary | ICD-10-CM

## 2023-10-04 PROCEDURE — 92557 COMPREHENSIVE HEARING TEST: CPT | Performed by: AUDIOLOGIST

## 2023-10-04 PROCEDURE — 92567 TYMPANOMETRY: CPT | Performed by: AUDIOLOGIST

## 2023-10-04 NOTE — PROGRESS NOTES
History   Isabel Jo is a 66 y.o. female who presented to the clinic this date with complaints of decreased hearing bilaterally. She reported a long standing gradual decline and noted it has gotten worse in the last year. She reported difficulty hearing the TV and following conversations. She reported occasional tinnitus. She reported history of vertigo. She denied aural fullness and ear pain. Summary   Tympanometry consistent with reduced TM mobility bilaterally. Pure tone testing indicates mild to moderately severe SNHL bilaterally. Word recognition scores were excellent bilaterally. Based on degree of hearing loss, binaural hearing aids are recommended. Results   Otoscopy:   Right: Clear EAC/Normal TM  Left: Clear EAC/Normal TM    Audiometry:   Right:  Mild to moderately severe  sloping SNHL  Left:  Mild to moderately severe  sloping SNHL         Tympanometry:    Right: Type As  Left: Type As      Plan   Results of today's testing were discussed with Ms. Clarisa Ornelas and the following recommendations were made:    Hearing aid evaluation as desired. Monitor hearing yearly, sooner with changes. Hearing protection as warranted.         Audiogram and Acoustic Immittance

## 2023-10-25 DIAGNOSIS — K21.9 GASTROESOPHAGEAL REFLUX DISEASE WITHOUT ESOPHAGITIS: ICD-10-CM

## 2023-10-25 RX ORDER — DILTIAZEM HYDROCHLORIDE 60 MG/1
60 TABLET, FILM COATED ORAL 4 TIMES DAILY
Qty: 360 TABLET | Refills: 1 | Status: SHIPPED | OUTPATIENT
Start: 2023-10-25 | End: 2024-04-22

## 2023-10-25 RX ORDER — ESOMEPRAZOLE MAGNESIUM 40 MG/1
40 CAPSULE, DELAYED RELEASE ORAL
Qty: 180 CAPSULE | Refills: 1 | Status: SHIPPED | OUTPATIENT
Start: 2023-10-25 | End: 2024-10-19

## 2023-10-25 NOTE — TELEPHONE ENCOUNTER
Jong  requests a refill on her medication.       Last office visit : 2023   Next office visit : Visit date not found     Requested Prescriptions     Pending Prescriptions Disp Refills    dilTIAZem (CARDIZEM) 60 MG tablet 360 tablet 1     Sig: TAKE 1 TABLET BY MOUTH FOUR TIMES A DAY    esomeprazole (NEXIUM) 40 MG delayed release capsule 180 capsule 1     Si po po bid before meals            Aayush Moe LPN

## 2023-10-26 DIAGNOSIS — K21.9 GASTROESOPHAGEAL REFLUX DISEASE WITHOUT ESOPHAGITIS: ICD-10-CM

## 2023-10-27 NOTE — TELEPHONE ENCOUNTER
Lorretta Dawson called to request a refill on her medication.       Last office visit : 9/25/2023   Next office visit : Visit date not found     Requested Prescriptions     Pending Prescriptions Disp Refills    esomeprazole (NEXIUM) 40 MG delayed release capsule 180 capsule 1     Sig: Take 1 capsule by mouth every morning (before breakfast) 1 po po bid before meals            Shavonne Lares, CMA

## 2023-10-30 RX ORDER — ESOMEPRAZOLE MAGNESIUM 40 MG/1
40 CAPSULE, DELAYED RELEASE ORAL
Qty: 180 CAPSULE | Refills: 1 | OUTPATIENT
Start: 2023-10-30 | End: 2024-10-24

## 2024-03-11 ENCOUNTER — HOSPITAL ENCOUNTER (OUTPATIENT)
Dept: GENERAL RADIOLOGY | Age: 64
Discharge: HOME | End: 2024-03-11
Payer: COMMERCIAL

## 2024-03-11 ENCOUNTER — OFFICE VISIT (OUTPATIENT)
Dept: FAMILY MEDICINE CLINIC | Age: 64
End: 2024-03-11
Payer: COMMERCIAL

## 2024-03-11 VITALS
RESPIRATION RATE: 20 BRPM | TEMPERATURE: 98.2 F | SYSTOLIC BLOOD PRESSURE: 132 MMHG | WEIGHT: 220 LBS | HEART RATE: 81 BPM | DIASTOLIC BLOOD PRESSURE: 82 MMHG | OXYGEN SATURATION: 97 % | HEIGHT: 61 IN | BODY MASS INDEX: 41.54 KG/M2

## 2024-03-11 DIAGNOSIS — S20.211A CONTUSION OF RIB ON RIGHT SIDE, INITIAL ENCOUNTER: ICD-10-CM

## 2024-03-11 DIAGNOSIS — Z79.899 MEDICATION MANAGEMENT: ICD-10-CM

## 2024-03-11 DIAGNOSIS — S20.211A CONTUSION OF RIB ON RIGHT SIDE, INITIAL ENCOUNTER: Primary | ICD-10-CM

## 2024-03-11 LAB
ALCOHOL URINE: ABNORMAL
AMPHETAMINE SCREEN, URINE: NEGATIVE
BARBITURATE SCREEN, URINE: NEGATIVE
BENZODIAZEPINE SCREEN, URINE: NEGATIVE
BUPRENORPHINE URINE: NEGATIVE
COCAINE METABOLITE SCREEN URINE: NEGATIVE
FENTANYL SCREEN, URINE: ABNORMAL
GABAPENTIN SCREEN, URINE: ABNORMAL
MDMA URINE: NEGATIVE
METHADONE SCREEN, URINE: NEGATIVE
METHAMPHETAMINE, URINE: NEGATIVE
OPIATE SCREEN URINE: NEGATIVE
OXYCODONE SCREEN URINE: NEGATIVE
PHENCYCLIDINE SCREEN URINE: NEGATIVE
PROPOXYPHENE SCREEN, URINE: ABNORMAL
SYNTHETIC CANNABINOIDS(K2) SCREEN, URINE: ABNORMAL
THC SCREEN, URINE: POSITIVE
TRAMADOL SCREEN URINE: ABNORMAL
TRICYCLIC ANTIDEPRESSANTS, UR: NEGATIVE

## 2024-03-11 PROCEDURE — 99214 OFFICE O/P EST MOD 30 MIN: CPT | Performed by: NURSE PRACTITIONER

## 2024-03-11 PROCEDURE — 96372 THER/PROPH/DIAG INJ SC/IM: CPT | Performed by: NURSE PRACTITIONER

## 2024-03-11 PROCEDURE — 71101 X-RAY EXAM UNILAT RIBS/CHEST: CPT

## 2024-03-11 PROCEDURE — 80305 DRUG TEST PRSMV DIR OPT OBS: CPT | Performed by: NURSE PRACTITIONER

## 2024-03-11 RX ORDER — HYDROCODONE BITARTRATE AND ACETAMINOPHEN 7.5; 325 MG/1; MG/1
1 TABLET ORAL EVERY 6 HOURS PRN
Qty: 12 TABLET | Refills: 0 | Status: SHIPPED | OUTPATIENT
Start: 2024-03-11 | End: 2024-03-14

## 2024-03-11 RX ORDER — DEXAMETHASONE SODIUM PHOSPHATE 10 MG/ML
4 INJECTION INTRAMUSCULAR; INTRAVENOUS ONCE
Status: COMPLETED | OUTPATIENT
Start: 2024-03-11 | End: 2024-03-11

## 2024-03-11 RX ORDER — TRIAMCINOLONE ACETONIDE 40 MG/ML
40 INJECTION, SUSPENSION INTRA-ARTICULAR; INTRAMUSCULAR ONCE
Status: COMPLETED | OUTPATIENT
Start: 2024-03-11 | End: 2024-03-11

## 2024-03-11 RX ADMIN — TRIAMCINOLONE ACETONIDE 40 MG: 40 INJECTION, SUSPENSION INTRA-ARTICULAR; INTRAMUSCULAR at 17:11

## 2024-03-11 RX ADMIN — DEXAMETHASONE SODIUM PHOSPHATE 4 MG: 10 INJECTION INTRAMUSCULAR; INTRAVENOUS at 17:09

## 2024-03-11 ASSESSMENT — PATIENT HEALTH QUESTIONNAIRE - PHQ9
2. FEELING DOWN, DEPRESSED OR HOPELESS: 0
SUM OF ALL RESPONSES TO PHQ QUESTIONS 1-9: 0
1. LITTLE INTEREST OR PLEASURE IN DOING THINGS: 0
SUM OF ALL RESPONSES TO PHQ QUESTIONS 1-9: 0
SUM OF ALL RESPONSES TO PHQ9 QUESTIONS 1 & 2: 0

## 2024-03-11 ASSESSMENT — ENCOUNTER SYMPTOMS
SHORTNESS OF BREATH: 0
RESPIRATORY NEGATIVE: 1

## 2024-03-11 NOTE — PROGRESS NOTES
Kenalog 40 mg given intramuscularly to left deltoid. Pt tolerated well.    DEXAMETHASONE 4 MG GIVEN INTRAMUSCULARLY IN LEFT deltoid. PT TOLERATED WELL.  
po bid before meals Yes Mary Jane Bonner APRN   Semaglutide-Weight Management (WEGOVY) 0.25 MG/0.5ML SOAJ SC injection Inject 0.25 mg into the skin every 7 days Yes Mary Jane Bonner APRN   LORATADINE ALLERGY RELIEF PO Take by mouth Yes Sofia Wisdom MD   Cholecalciferol (VITAMIN D3) 5000 UNITS CAPS Take by mouth Patient takes 3 daily Yes Sofia Wisdom MD   Polyethylene Glycol 3350 (MIRALAX PO) Take by mouth Yes Sofia Wisdom MD     No Known Allergies  Past Surgical History:   Procedure Laterality Date    ABDOMINAL EXPLORATION SURGERY      CHOLECYSTECTOMY      EYE SURGERY      FINGER SURGERY      GALLBLADDER SURGERY       History reviewed. No pertinent family history.  Social History     Socioeconomic History    Marital status:      Spouse name: Not on file    Number of children: Not on file    Years of education: Not on file    Highest education level: Not on file   Occupational History    Not on file   Tobacco Use    Smoking status: Former     Current packs/day: 0.00     Average packs/day: 0.8 packs/day for 43.6 years (32.7 ttl pk-yrs)     Types: Cigarettes     Start date: 1978     Quit date: 2022     Years since quittin.6    Smokeless tobacco: Never   Substance and Sexual Activity    Alcohol use: No    Drug use: No    Sexual activity: Not on file   Other Topics Concern    Not on file   Social History Narrative    Not on file     Social Determinants of Health     Financial Resource Strain: Low Risk  (2023)    Overall Financial Resource Strain (CARDIA)     Difficulty of Paying Living Expenses: Not hard at all   Food Insecurity: Not on file (2023)   Transportation Needs: Unknown (2023)    PRAPARE - Transportation     Lack of Transportation (Medical): Not on file     Lack of Transportation (Non-Medical): No   Physical Activity: Not on file   Stress: Not on file   Social Connections: Not on file   Intimate Partner Violence: Not on file   Housing Stability: Unknown

## 2024-03-11 NOTE — PATIENT INSTRUCTIONS
Ice/heat prn  Slow position changes; deep breathing after bracing with pillow.    ER if any sudden shortness of air or worsening.

## 2024-03-12 DIAGNOSIS — Z79.899 MEDICATION MANAGEMENT: ICD-10-CM

## 2024-03-14 ENCOUNTER — HOSPITAL ENCOUNTER (OUTPATIENT)
Dept: GENERAL RADIOLOGY | Age: 64
Discharge: HOME | End: 2024-03-14
Payer: COMMERCIAL

## 2024-03-14 DIAGNOSIS — S20.211A CONTUSION OF RIB ON RIGHT SIDE, INITIAL ENCOUNTER: ICD-10-CM

## 2024-03-14 PROCEDURE — 71250 CT THORAX DX C-: CPT

## 2024-03-17 LAB
AMPHET CTO UR CFM-MCNC: NORMAL NG/ML
AMPHET UR-MCNC: <50 NG/ML
BARBITURATES CTO UR CFM-MCNC: NEGATIVE NG/ML
BENZODIAZ CTO UR CFM-MCNC: NEGATIVE NG/ML
BUPRENORPHINE UR QL SCN: NEGATIVE NG/ML
CANNABINOIDS CTO UR CFM-MCNC: NORMAL NG/ML
CARBOXYTHC UR-MCNC: >500 NG/ML
CARISOPRODOL UR QL: NEGATIVE NG/ML
COCAINE CTO UR CFM-MCNC: NEGATIVE NG/ML
CREAT UR-MCNC: 246.5 MG/DL (ref 20–400)
DRUG SCREEN COMMENT UR-IMP: NORMAL
ETHYL GLUCURONIDE UR QL SCN: NEGATIVE NG/ML
FENTANYL UR QL: NEGATIVE NG/ML
MDA UR-MCNC: <200 NG/ML
MDEA UR-MCNC: <200 NG/ML
MDMA UR-MCNC: <200 NG/ML
MEPERIDINE UR QL: NEGATIVE NG/ML
METHADONE CTO UR CFM-MCNC: NEGATIVE NG/ML
METHAMPHET UR-MCNC: <200 NG/ML
OPIATES UR QL SCN: NEGATIVE NG/ML
OXYCODONE SCREEN URINE: NEGATIVE NG/ML
PCP CTO UR CFM-MCNC: NEGATIVE NG/ML
PHENTERMINE UR CFM-MCNC: >5000 NG/ML
PROPOXYPH CTO UR CFM-MCNC: NEGATIVE NG/ML
TAPENTADOL UR QL SCN: NEGATIVE NG/ML
TRAMADOL SCREEN URINE: NEGATIVE NG/ML
ZOLPIDEM UR QL SCN: NEGATIVE NG/ML

## 2024-03-22 ENCOUNTER — TELEPHONE (OUTPATIENT)
Dept: ENT CLINIC | Age: 64
End: 2024-03-22

## 2024-04-13 DIAGNOSIS — E78.2 MIXED HYPERLIPIDEMIA: ICD-10-CM

## 2024-04-15 RX ORDER — ROSUVASTATIN CALCIUM 10 MG/1
TABLET, COATED ORAL
Qty: 90 TABLET | Refills: 1 | Status: SHIPPED | OUTPATIENT
Start: 2024-04-15

## 2024-04-15 NOTE — TELEPHONE ENCOUNTER
Pharmacy requests a refill on Su Brice's medication.    Last Office Visit: 3/11/2024  Next Office Visit: Visit date not found     Requested Prescriptions     Pending Prescriptions Disp Refills    rosuvastatin (CRESTOR) 10 MG tablet [Pharmacy Med Name: ROSUVASTATIN CALCIUM 10 MG TAB] 90 tablet 1     Sig: TAKE 1 TABLET BY MOUTH DAILY EACH EVENING FOR CHOLESTEROL       Jaida Sargent LPN

## 2024-04-20 DIAGNOSIS — K21.9 GASTROESOPHAGEAL REFLUX DISEASE WITHOUT ESOPHAGITIS: ICD-10-CM

## 2024-04-22 RX ORDER — ESOMEPRAZOLE MAGNESIUM 40 MG/1
CAPSULE, DELAYED RELEASE ORAL
Qty: 180 CAPSULE | Refills: 1 | Status: SHIPPED | OUTPATIENT
Start: 2024-04-22

## 2024-04-22 NOTE — TELEPHONE ENCOUNTER
PHARMACY called to request a refill on her medication.      Last office visit : 3/11/2024   Next office visit : Visit date not found     Requested Prescriptions     Pending Prescriptions Disp Refills    esomeprazole (NEXIUM) 40 MG delayed release capsule [Pharmacy Med Name: ESOMEPRAZOLE MAG DR 40 MG CAP] 180 capsule 1     Sig: TAKE 1 CAPSULE BY MOUTH TWICE A DAY EVERY MORNING (BEFORE BREAKFAST) AND BEFORE EVENING MEAL            Rhina Lau MA, CCMA

## 2024-06-18 DIAGNOSIS — K21.9 GASTROESOPHAGEAL REFLUX DISEASE WITHOUT ESOPHAGITIS: ICD-10-CM

## 2024-06-18 NOTE — TELEPHONE ENCOUNTER
PHARMACY called to request a refill on her medication.      Last office visit : 3/11/2024   Next office visit : Visit date not found     Requested Prescriptions     Pending Prescriptions Disp Refills    dilTIAZem (CARDIZEM) 60 MG tablet [Pharmacy Med Name: DILTIAZEM 60 MG TABLET] 120 tablet 5     Sig: TAKE 1 TABLET BY MOUTH 4 TIMES A DAY            Rhina Lau MA, [facility]

## 2024-06-19 RX ORDER — DILTIAZEM HYDROCHLORIDE 60 MG/1
TABLET, FILM COATED ORAL
Qty: 120 TABLET | Refills: 5 | Status: SHIPPED | OUTPATIENT
Start: 2024-06-19

## 2024-10-15 DIAGNOSIS — E78.2 MIXED HYPERLIPIDEMIA: ICD-10-CM

## 2024-10-15 DIAGNOSIS — K21.9 GASTROESOPHAGEAL REFLUX DISEASE WITHOUT ESOPHAGITIS: ICD-10-CM

## 2024-10-15 RX ORDER — ROSUVASTATIN CALCIUM 10 MG/1
TABLET, COATED ORAL
Qty: 30 TABLET | Refills: 0 | Status: SHIPPED | OUTPATIENT
Start: 2024-10-15

## 2024-10-15 RX ORDER — ESOMEPRAZOLE MAGNESIUM 40 MG/1
CAPSULE, DELAYED RELEASE ORAL
Qty: 60 CAPSULE | Refills: 0 | Status: SHIPPED | OUTPATIENT
Start: 2024-10-15

## 2024-10-15 NOTE — TELEPHONE ENCOUNTER
Su Brice called to request a refill on her medication.      Last office visit : 3/11/2024   Next office visit : 10/28/2024     Requested Prescriptions     Pending Prescriptions Disp Refills    rosuvastatin (CRESTOR) 10 MG tablet [Pharmacy Med Name: ROSUVASTATIN CALCIUM 10 MG TAB] 30 tablet 0     Sig: TAKE 1 TABLET BY MOUTH DAILY EACH EVENING FOR CHOLESTEROL    esomeprazole (NEXIUM) 40 MG delayed release capsule [Pharmacy Med Name: ESOMEPRAZOLE MAG DR 40 MG CAP] 60 capsule 0     Sig: TAKE 1 CAPSULE BY MOUTH TWICE A DAY EVERY MORNING (BEFORE BREAKFAST) AND BEFORE EVENING MEAL            Wendy Ennis LPN

## 2024-10-28 ENCOUNTER — OFFICE VISIT (OUTPATIENT)
Dept: FAMILY MEDICINE CLINIC | Age: 64
End: 2024-10-28
Payer: COMMERCIAL

## 2024-10-28 VITALS
DIASTOLIC BLOOD PRESSURE: 70 MMHG | TEMPERATURE: 97 F | WEIGHT: 234.4 LBS | SYSTOLIC BLOOD PRESSURE: 128 MMHG | HEART RATE: 80 BPM | HEIGHT: 61 IN | BODY MASS INDEX: 44.25 KG/M2 | OXYGEN SATURATION: 97 % | RESPIRATION RATE: 18 BRPM

## 2024-10-28 DIAGNOSIS — K21.9 GASTROESOPHAGEAL REFLUX DISEASE WITHOUT ESOPHAGITIS: ICD-10-CM

## 2024-10-28 DIAGNOSIS — B96.20 E-COLI UTI: Primary | ICD-10-CM

## 2024-10-28 DIAGNOSIS — N39.0 E-COLI UTI: Primary | ICD-10-CM

## 2024-10-28 DIAGNOSIS — Z12.31 SCREENING MAMMOGRAM, ENCOUNTER FOR: ICD-10-CM

## 2024-10-28 DIAGNOSIS — E78.2 MIXED HYPERLIPIDEMIA: ICD-10-CM

## 2024-10-28 DIAGNOSIS — Z23 NEEDS FLU SHOT: ICD-10-CM

## 2024-10-28 DIAGNOSIS — Z00.00 ANNUAL PHYSICAL EXAM: Primary | ICD-10-CM

## 2024-10-28 DIAGNOSIS — R73.01 IMPAIRED FASTING GLUCOSE: ICD-10-CM

## 2024-10-28 DIAGNOSIS — Z00.00 ANNUAL PHYSICAL EXAM: ICD-10-CM

## 2024-10-28 LAB
25(OH)D3 SERPL-MCNC: 93.1 NG/ML
ALBUMIN SERPL-MCNC: 4.5 G/DL (ref 3.5–5.2)
ALP SERPL-CCNC: 103 U/L (ref 35–104)
ALT SERPL-CCNC: 21 U/L (ref 5–33)
AMORPH SED URNS QL MICRO: ABNORMAL /HPF
ANION GAP SERPL CALCULATED.3IONS-SCNC: 12 MMOL/L (ref 7–19)
AST SERPL-CCNC: 20 U/L (ref 5–32)
BACTERIA #/AREA URNS HPF: ABNORMAL /HPF
BASOPHILS # BLD: 0.1 K/UL (ref 0–0.2)
BASOPHILS NFR BLD: 1.4 % (ref 0–1)
BILIRUB SERPL-MCNC: 0.3 MG/DL (ref 0.2–1.2)
BILIRUB UR QL STRIP: ABNORMAL
BUN SERPL-MCNC: 15 MG/DL (ref 8–23)
CALCIUM SERPL-MCNC: 9.5 MG/DL (ref 8.8–10.2)
CHLORIDE SERPL-SCNC: 101 MMOL/L (ref 98–111)
CHOLEST SERPL-MCNC: 205 MG/DL (ref 0–199)
CLARITY UR: ABNORMAL
CO2 SERPL-SCNC: 28 MMOL/L (ref 22–29)
COLOR UR: ABNORMAL
CREAT SERPL-MCNC: 0.8 MG/DL (ref 0.5–0.9)
CRYSTALS URNS MICRO: ABNORMAL /HPF
EOSINOPHIL # BLD: 0.1 K/UL (ref 0–0.6)
EOSINOPHIL NFR BLD: 1.3 % (ref 0–5)
ERYTHROCYTE [DISTWIDTH] IN BLOOD BY AUTOMATED COUNT: 12 % (ref 11.5–14.5)
GLUCOSE SERPL-MCNC: 117 MG/DL (ref 70–99)
GLUCOSE UR STRIP.AUTO-MCNC: NEGATIVE MG/DL
HBA1C MFR BLD: 6.3 % (ref 4–5.6)
HCT VFR BLD AUTO: 50.3 % (ref 37–47)
HDLC SERPL-MCNC: 57 MG/DL (ref 40–60)
HGB BLD-MCNC: 16.1 G/DL (ref 12–16)
HGB UR STRIP.AUTO-MCNC: NEGATIVE MG/L
HYALINE CASTS #/AREA URNS LPF: ABNORMAL /LPF (ref 0–5)
IMM GRANULOCYTES # BLD: 0.1 K/UL
KETONES UR STRIP.AUTO-MCNC: ABNORMAL MG/DL
LDLC SERPL CALC-MCNC: 108 MG/DL
LEUKOCYTE ESTERASE UR QL STRIP.AUTO: ABNORMAL
LYMPHOCYTES # BLD: 1.8 K/UL (ref 1.1–4.5)
LYMPHOCYTES NFR BLD: 21.3 % (ref 20–40)
MCH RBC QN AUTO: 29.2 PG (ref 27–31)
MCHC RBC AUTO-ENTMCNC: 32 G/DL (ref 33–37)
MCV RBC AUTO: 91.3 FL (ref 81–99)
MONOCYTES # BLD: 0.6 K/UL (ref 0–0.9)
MONOCYTES NFR BLD: 7.3 % (ref 0–10)
MUCOUS THREADS URNS QL MICRO: ABNORMAL /LPF
NEUTROPHILS # BLD: 5.7 K/UL (ref 1.5–7.5)
NEUTS SEG NFR BLD: 67.9 % (ref 50–65)
NITRITE UR QL STRIP.AUTO: POSITIVE
PH UR STRIP.AUTO: 5.5 [PH] (ref 5–8)
PLATELET # BLD AUTO: 352 K/UL (ref 130–400)
PMV BLD AUTO: 10.8 FL (ref 9.4–12.3)
POTASSIUM SERPL-SCNC: 4.2 MMOL/L (ref 3.5–5)
PROT SERPL-MCNC: 7.3 G/DL (ref 6.4–8.3)
PROT UR STRIP.AUTO-MCNC: 30 MG/DL
RBC # BLD AUTO: 5.51 M/UL (ref 4.2–5.4)
RBC #/AREA URNS HPF: ABNORMAL /HPF (ref 0–2)
SODIUM SERPL-SCNC: 141 MMOL/L (ref 136–145)
SP GR UR STRIP.AUTO: 1.03 (ref 1–1.03)
SQUAMOUS #/AREA URNS HPF: ABNORMAL /HPF
TRIGL SERPL-MCNC: 200 MG/DL (ref 0–149)
TSH SERPL DL<=0.005 MIU/L-ACNC: 0.89 UIU/ML (ref 0.27–4.2)
UROBILINOGEN UR STRIP.AUTO-MCNC: 1 E.U./DL
WBC # BLD AUTO: 8.5 K/UL (ref 4.8–10.8)
WBC #/AREA URNS HPF: ABNORMAL /HPF (ref 0–5)

## 2024-10-28 PROCEDURE — 90471 IMMUNIZATION ADMIN: CPT | Performed by: NURSE PRACTITIONER

## 2024-10-28 PROCEDURE — 90661 CCIIV3 VAC ABX FR 0.5 ML IM: CPT | Performed by: NURSE PRACTITIONER

## 2024-10-28 PROCEDURE — 99396 PREV VISIT EST AGE 40-64: CPT | Performed by: NURSE PRACTITIONER

## 2024-10-28 RX ORDER — ROSUVASTATIN CALCIUM 10 MG/1
TABLET, COATED ORAL
Qty: 30 TABLET | Refills: 5 | Status: SHIPPED | OUTPATIENT
Start: 2024-10-28

## 2024-10-28 RX ORDER — DILTIAZEM HCL 60 MG
60 TABLET ORAL 4 TIMES DAILY
Qty: 120 TABLET | Refills: 5 | Status: SHIPPED | OUTPATIENT
Start: 2024-10-28

## 2024-10-28 RX ORDER — ESOMEPRAZOLE MAGNESIUM 40 MG/1
40 CAPSULE, DELAYED RELEASE ORAL
Qty: 60 CAPSULE | Refills: 5 | Status: SHIPPED | OUTPATIENT
Start: 2024-10-28

## 2024-10-28 SDOH — ECONOMIC STABILITY: FOOD INSECURITY: WITHIN THE PAST 12 MONTHS, THE FOOD YOU BOUGHT JUST DIDN'T LAST AND YOU DIDN'T HAVE MONEY TO GET MORE.: NEVER TRUE

## 2024-10-28 SDOH — ECONOMIC STABILITY: FOOD INSECURITY: WITHIN THE PAST 12 MONTHS, YOU WORRIED THAT YOUR FOOD WOULD RUN OUT BEFORE YOU GOT MONEY TO BUY MORE.: NEVER TRUE

## 2024-10-28 SDOH — ECONOMIC STABILITY: INCOME INSECURITY: HOW HARD IS IT FOR YOU TO PAY FOR THE VERY BASICS LIKE FOOD, HOUSING, MEDICAL CARE, AND HEATING?: NOT HARD AT ALL

## 2024-10-28 ASSESSMENT — ENCOUNTER SYMPTOMS: RESPIRATORY NEGATIVE: 1

## 2024-10-28 NOTE — PROGRESS NOTES
After obtaining consent, and per orders of Mary Jane Bonner, injection of Influenza given in Right deltoid by Velia Dillard MA. Patient instructed to remain in clinic for 20 minutes afterwards, and to report any adverse reaction to me immediately.     Hello,     Please create an EPIC MC Referral/Order via [facility] to support this patient visits to [facility]:    [facility] - [place] [id number]  [address] [id number] - [address]  CONSULT & TREAT   [place] [id number]  CPT CODES 96500, [id number], [address]  6 VISITS    Please inform us when this has been completed.      Thank you,  Sleep Center Staff

## 2024-10-28 NOTE — PROGRESS NOTES
SUBJECTIVE:    Patient ID: Su Brice is a64 y.o. female.  Su Brice is here today for Annual Exam (Yearly physcial, patient is wanting lab work done today. Patient is fasting today. )  .    HPI:   HPI     Pt is here today for annual physical.  Pt is here today for check up and is fasting for lab.     GERD-states well-controlled with Nexium taken twice daily.  Her gastroenterologist has recommended this dose.  She does state that her swallowing is improved with the gastroenterology orders of twice a day Nexium as well as diltiazem 60 mg 4 times a day.  However, she does state that oftentimes she may only get 3 doses in but she can tell a difference.    Hyperlipidemia-continues to use rosuvastatin 10 mg nightly.  She is fasting today for lab.  She does not have any complaints with this medication today.    She was prescribed Wegovy previously.  She states that there was a supply issue and she was not able to obtain that medication.  She is somewhat interested in having this resent to the pharmacy.  Today's BMI is 44.29.  Wants to hold off on script at this time and will message when she wants me to resend to pharmacy.              Past Medical History:   Diagnosis Date    Bright's disease     COPD (chronic obstructive pulmonary disease) (HCC)     Degenerative disorder of bone     GERD (gastroesophageal reflux disease)     Hyperlipidemia     Long's toe     Ulcer      Prior to Visit Medications    Medication Sig Taking? Authorizing Provider   dilTIAZem (CARDIZEM) 60 MG tablet Take 1 tablet by mouth 4 times daily Yes Mary Jane Bonner APRN   esomeprazole (NEXIUM) 40 MG delayed release capsule Take 1 capsule by mouth 2 times daily (before meals) Yes Mary Jane Bonner APRN   rosuvastatin (CRESTOR) 10 MG tablet TAKE 1 TABLET BY MOUTH DAILY EACH EVENING FOR CHOLESTEROL Yes Mary Jane Bonner APRN   LORATADINE ALLERGY RELIEF PO Take by mouth Yes Provider, MD Sofia   Cholecalciferol (VITAMIN D3) 5000 UNITS CAPS Take

## 2024-10-29 ENCOUNTER — TELEPHONE (OUTPATIENT)
Dept: FAMILY MEDICINE CLINIC | Age: 64
End: 2024-10-29

## 2024-10-29 NOTE — TELEPHONE ENCOUNTER
Called patient to let her know that [facility] scheduled her mammogram for 11/06 at 8am. Patient is aware and voiced understanding.

## 2024-10-30 LAB
BACTERIA UR CULT: ABNORMAL
BACTERIA UR CULT: ABNORMAL
ORGANISM: ABNORMAL

## 2024-10-30 RX ORDER — CEFDINIR 300 MG/1
300 CAPSULE ORAL 2 TIMES DAILY
Qty: 14 CAPSULE | Refills: 0 | Status: SHIPPED | OUTPATIENT
Start: 2024-10-30 | End: 2024-11-06

## 2024-12-09 DIAGNOSIS — E78.2 MIXED HYPERLIPIDEMIA: ICD-10-CM

## 2024-12-19 DIAGNOSIS — K21.9 GASTROESOPHAGEAL REFLUX DISEASE WITHOUT ESOPHAGITIS: ICD-10-CM

## 2024-12-19 DIAGNOSIS — E78.2 MIXED HYPERLIPIDEMIA: ICD-10-CM

## 2024-12-19 RX ORDER — ROSUVASTATIN CALCIUM 10 MG/1
TABLET, COATED ORAL
Qty: 30 TABLET | Refills: 5 | Status: SHIPPED | OUTPATIENT
Start: 2024-12-19

## 2024-12-19 RX ORDER — DILTIAZEM HCL 60 MG
60 TABLET ORAL 4 TIMES DAILY
Qty: 120 TABLET | Refills: 5 | Status: SHIPPED | OUTPATIENT
Start: 2024-12-19

## 2024-12-19 RX ORDER — ESOMEPRAZOLE MAGNESIUM 40 MG/1
40 CAPSULE, DELAYED RELEASE ORAL
Qty: 60 CAPSULE | Refills: 5 | Status: SHIPPED | OUTPATIENT
Start: 2024-12-19

## 2024-12-19 NOTE — TELEPHONE ENCOUNTER
Su Brice called to request a refill on her medication.      Last office visit : 10/28/2024   Next office visit : Visit date not found     Requested Prescriptions     Pending Prescriptions Disp Refills    rosuvastatin (CRESTOR) 10 MG tablet 30 tablet 5     Sig: TAKE 1 TABLET BY MOUTH DAILY EACH EVENING FOR CHOLESTEROL    Semaglutide-Weight Management (WEGOVY) 0.25 MG/0.5ML SOAJ SC injection 6 mL 1     Sig: Inject 0.25 mg into the skin every 7 days    esomeprazole (NEXIUM) 40 MG delayed release capsule 60 capsule 5     Sig: Take 1 capsule by mouth 2 times daily (before meals)    dilTIAZem (CARDIZEM) 60 MG tablet 120 tablet 5     Sig: Take 1 tablet by mouth 4 times daily            Wendy Ennis LPN

## 2025-06-13 DIAGNOSIS — K21.9 GASTROESOPHAGEAL REFLUX DISEASE WITHOUT ESOPHAGITIS: ICD-10-CM

## 2025-06-13 DIAGNOSIS — E78.2 MIXED HYPERLIPIDEMIA: ICD-10-CM

## 2025-06-13 RX ORDER — DILTIAZEM HCL 60 MG
60 TABLET ORAL 4 TIMES DAILY
Qty: 120 TABLET | Refills: 5 | Status: SHIPPED | OUTPATIENT
Start: 2025-06-13

## 2025-06-13 RX ORDER — ESOMEPRAZOLE MAGNESIUM 40 MG/1
40 CAPSULE, DELAYED RELEASE ORAL
Qty: 60 CAPSULE | Refills: 5 | Status: SHIPPED | OUTPATIENT
Start: 2025-06-13

## 2025-06-13 RX ORDER — ROSUVASTATIN CALCIUM 10 MG/1
TABLET, COATED ORAL
Qty: 30 TABLET | Refills: 5 | Status: SHIPPED | OUTPATIENT
Start: 2025-06-13

## 2025-06-13 NOTE — TELEPHONE ENCOUNTER
Su Brice called to request a refill on her medication.      Last office visit : Visit date not found   Next office visit : Visit date not found     Requested Prescriptions     Pending Prescriptions Disp Refills    dilTIAZem (CARDIZEM) 60 MG immediate release tablet 120 tablet 5     Sig: Take 1 tablet by mouth 4 times daily    esomeprazole (NEXIUM) 40 MG delayed release capsule 60 capsule 5     Sig: Take 1 capsule by mouth 2 times daily (before meals)    rosuvastatin (CRESTOR) 10 MG tablet 30 tablet 5     Sig: TAKE 1 TABLET BY MOUTH DAILY EACH EVENING FOR CHOLESTEROL            Velia Dillard, [facility]

## 2025-06-23 DIAGNOSIS — E78.2 MIXED HYPERLIPIDEMIA: ICD-10-CM

## 2025-06-23 DIAGNOSIS — K21.9 GASTROESOPHAGEAL REFLUX DISEASE WITHOUT ESOPHAGITIS: ICD-10-CM

## 2025-06-23 RX ORDER — DILTIAZEM HCL 60 MG
60 TABLET ORAL 4 TIMES DAILY
Qty: 360 TABLET | Refills: 1 | Status: SHIPPED | OUTPATIENT
Start: 2025-06-23

## 2025-06-23 RX ORDER — ROSUVASTATIN CALCIUM 10 MG/1
TABLET, COATED ORAL
Qty: 90 TABLET | Refills: 1 | Status: SHIPPED | OUTPATIENT
Start: 2025-06-23

## 2025-06-23 RX ORDER — ESOMEPRAZOLE MAGNESIUM 40 MG/1
40 CAPSULE, DELAYED RELEASE ORAL
Qty: 60 CAPSULE | Refills: 5 | Status: SHIPPED | OUTPATIENT
Start: 2025-06-23

## 2025-06-23 NOTE — TELEPHONE ENCOUNTER
Su Brice called to request a refill on her medication.      Last office visit : 10/28/2024  Next office visit : 7/2/2025     Requested Prescriptions     Pending Prescriptions Disp Refills    dilTIAZem (CARDIZEM) 60 MG immediate release tablet 360 tablet 1     Sig: Take 1 tablet by mouth 4 times daily    esomeprazole (NEXIUM) 40 MG delayed release capsule 60 capsule 5     Sig: Take 1 capsule by mouth 2 times daily (before meals)    rosuvastatin (CRESTOR) 10 MG tablet 90 tablet 1     Sig: TAKE 1 TABLET BY MOUTH DAILY EACH EVENING FOR CHOLESTEROL            Wendy Ennis LPN

## 2025-07-02 ENCOUNTER — OFFICE VISIT (OUTPATIENT)
Age: 65
End: 2025-07-02
Payer: COMMERCIAL

## 2025-07-02 VITALS
HEIGHT: 61 IN | DIASTOLIC BLOOD PRESSURE: 80 MMHG | SYSTOLIC BLOOD PRESSURE: 124 MMHG | WEIGHT: 237.4 LBS | BODY MASS INDEX: 44.82 KG/M2 | RESPIRATION RATE: 14 BRPM | OXYGEN SATURATION: 98 % | HEART RATE: 77 BPM | TEMPERATURE: 97.8 F

## 2025-07-02 DIAGNOSIS — Z23 NEED FOR PNEUMOCOCCAL 20-VALENT CONJUGATE VACCINATION: ICD-10-CM

## 2025-07-02 DIAGNOSIS — E78.2 MIXED HYPERLIPIDEMIA: ICD-10-CM

## 2025-07-02 DIAGNOSIS — R73.01 IFG (IMPAIRED FASTING GLUCOSE): ICD-10-CM

## 2025-07-02 DIAGNOSIS — K22.4 ESOPHAGEAL SPASM: ICD-10-CM

## 2025-07-02 DIAGNOSIS — Z87.891 PERSONAL HISTORY OF TOBACCO USE: ICD-10-CM

## 2025-07-02 DIAGNOSIS — R73.01 IFG (IMPAIRED FASTING GLUCOSE): Primary | ICD-10-CM

## 2025-07-02 DIAGNOSIS — K21.9 GASTROESOPHAGEAL REFLUX DISEASE WITHOUT ESOPHAGITIS: ICD-10-CM

## 2025-07-02 DIAGNOSIS — Z13.21 ENCOUNTER FOR VITAMIN DEFICIENCY SCREENING: ICD-10-CM

## 2025-07-02 LAB
25(OH)D3 SERPL-MCNC: 87.7 NG/ML
ALBUMIN SERPL-MCNC: 4 G/DL (ref 3.5–5.2)
ALP SERPL-CCNC: 96 U/L (ref 35–104)
ALT SERPL-CCNC: 13 U/L (ref 10–35)
ANION GAP SERPL CALCULATED.3IONS-SCNC: 11 MMOL/L (ref 8–16)
AST SERPL-CCNC: 22 U/L (ref 10–35)
BACTERIA URNS QL MICRO: ABNORMAL /HPF
BASOPHILS # BLD: 0.1 K/UL (ref 0–0.2)
BASOPHILS NFR BLD: 1 % (ref 0–1)
BILIRUB SERPL-MCNC: 0.3 MG/DL (ref 0.2–1.2)
BILIRUB UR QL STRIP: NEGATIVE
BUN SERPL-MCNC: 14 MG/DL (ref 8–23)
CALCIUM SERPL-MCNC: 9.3 MG/DL (ref 8.8–10.2)
CHLORIDE SERPL-SCNC: 99 MMOL/L (ref 98–107)
CHOLEST SERPL-MCNC: 195 MG/DL (ref 0–199)
CLARITY UR: ABNORMAL
CO2 SERPL-SCNC: 29 MMOL/L (ref 22–29)
COLOR UR: YELLOW
CREAT SERPL-MCNC: 0.8 MG/DL (ref 0.5–0.9)
CRYSTALS URNS MICRO: ABNORMAL /HPF
EOSINOPHIL # BLD: 0.2 K/UL (ref 0–0.6)
EOSINOPHIL NFR BLD: 2.3 % (ref 0–5)
EPI CELLS #/AREA URNS AUTO: 4 /HPF (ref 0–5)
ERYTHROCYTE [DISTWIDTH] IN BLOOD BY AUTOMATED COUNT: 12.1 % (ref 11.5–14.5)
GLUCOSE SERPL-MCNC: 97 MG/DL (ref 70–99)
GLUCOSE UR STRIP.AUTO-MCNC: NEGATIVE MG/DL
HBA1C MFR BLD: 6.1 % (ref 4–5.6)
HCT VFR BLD AUTO: 46.8 % (ref 37–47)
HDLC SERPL-MCNC: 47 MG/DL (ref 40–60)
HGB BLD-MCNC: 15 G/DL (ref 12–16)
HGB UR STRIP.AUTO-MCNC: NEGATIVE MG/L
HYALINE CASTS #/AREA URNS AUTO: 1 /HPF (ref 0–8)
IMM GRANULOCYTES # BLD: 0.1 K/UL
KETONES UR STRIP.AUTO-MCNC: NEGATIVE MG/DL
LDLC SERPL CALC-MCNC: 108 MG/DL
LEUKOCYTE ESTERASE UR QL STRIP.AUTO: ABNORMAL
LYMPHOCYTES # BLD: 1.7 K/UL (ref 1.1–4.5)
LYMPHOCYTES NFR BLD: 25.1 % (ref 20–40)
MCH RBC QN AUTO: 29 PG (ref 27–31)
MCHC RBC AUTO-ENTMCNC: 32.1 G/DL (ref 33–37)
MCV RBC AUTO: 90.5 FL (ref 81–99)
MONOCYTES # BLD: 0.5 K/UL (ref 0–0.9)
MONOCYTES NFR BLD: 7.6 % (ref 0–10)
NEUTROPHILS # BLD: 4.3 K/UL (ref 1.5–7.5)
NEUTS SEG NFR BLD: 63.3 % (ref 50–65)
NITRITE UR QL STRIP.AUTO: NEGATIVE
PH UR STRIP.AUTO: 7.5 [PH] (ref 5–8)
PLATELET # BLD AUTO: 301 K/UL (ref 130–400)
PMV BLD AUTO: 10.5 FL (ref 9.4–12.3)
POTASSIUM SERPL-SCNC: 4.5 MMOL/L (ref 3.5–5)
PROT SERPL-MCNC: 6.6 G/DL (ref 6.4–8.3)
PROT UR STRIP.AUTO-MCNC: NEGATIVE MG/DL
RBC # BLD AUTO: 5.17 M/UL (ref 4.2–5.4)
RBC #/AREA URNS AUTO: 2 /HPF (ref 0–4)
SODIUM SERPL-SCNC: 139 MMOL/L (ref 136–145)
SP GR UR STRIP.AUTO: 1.02 (ref 1–1.03)
T4 FREE SERPL-MCNC: 1.14 NG/DL (ref 0.93–1.7)
TRIGL SERPL-MCNC: 199 MG/DL (ref 0–149)
TSH SERPL DL<=0.005 MIU/L-ACNC: 1.54 UIU/ML (ref 0.27–4.2)
UROBILINOGEN UR STRIP.AUTO-MCNC: 0.2 E.U./DL
WBC # BLD AUTO: 6.9 K/UL (ref 4.8–10.8)
WBC #/AREA URNS AUTO: 11 /HPF (ref 0–5)

## 2025-07-02 PROCEDURE — G0296 VISIT TO DETERM LDCT ELIG: HCPCS | Performed by: NURSE PRACTITIONER

## 2025-07-02 PROCEDURE — 99214 OFFICE O/P EST MOD 30 MIN: CPT | Performed by: NURSE PRACTITIONER

## 2025-07-02 RX ORDER — DILTIAZEM HYDROCHLORIDE 120 MG/1
120 CAPSULE, COATED, EXTENDED RELEASE ORAL DAILY
Qty: 90 CAPSULE | Refills: 3 | Status: SHIPPED | OUTPATIENT
Start: 2025-07-02

## 2025-07-02 RX ORDER — DILTIAZEM HYDROCHLORIDE 120 MG/1
120 CAPSULE, COATED, EXTENDED RELEASE ORAL DAILY
COMMUNITY
Start: 2025-05-12 | End: 2025-07-02 | Stop reason: SDUPTHER

## 2025-07-02 RX ORDER — ROSUVASTATIN CALCIUM 10 MG/1
TABLET, COATED ORAL
Qty: 90 TABLET | Refills: 3 | Status: SHIPPED | OUTPATIENT
Start: 2025-07-02

## 2025-07-02 RX ORDER — ESOMEPRAZOLE MAGNESIUM 40 MG/1
40 CAPSULE, DELAYED RELEASE ORAL
Qty: 180 CAPSULE | Refills: 3 | Status: SHIPPED | OUTPATIENT
Start: 2025-07-02

## 2025-07-02 SDOH — ECONOMIC STABILITY: FOOD INSECURITY: WITHIN THE PAST 12 MONTHS, THE FOOD YOU BOUGHT JUST DIDN'T LAST AND YOU DIDN'T HAVE MONEY TO GET MORE.: NEVER TRUE

## 2025-07-02 SDOH — ECONOMIC STABILITY: FOOD INSECURITY: WITHIN THE PAST 12 MONTHS, YOU WORRIED THAT YOUR FOOD WOULD RUN OUT BEFORE YOU GOT MONEY TO BUY MORE.: NEVER TRUE

## 2025-07-02 ASSESSMENT — PATIENT HEALTH QUESTIONNAIRE - PHQ9
2. FEELING DOWN, DEPRESSED OR HOPELESS: NOT AT ALL
1. LITTLE INTEREST OR PLEASURE IN DOING THINGS: NOT AT ALL
SUM OF ALL RESPONSES TO PHQ QUESTIONS 1-9: 0

## 2025-07-02 ASSESSMENT — ENCOUNTER SYMPTOMS: RESPIRATORY NEGATIVE: 1

## 2025-07-02 NOTE — PROGRESS NOTES
MARIAH JAQUEZ [facility] FAM[facility]IC[facility], INTERNAL MEDICINE AND PEDIATRICS  [address]  [place] [id number]       SUBJECTIVE:    Patient ID: Su Brice is [id number] y.o. female.  Su Brice is here today for 6 Month Follow-Up (Patient is here today for her 6 month follow up and med refills. Patient states she has been feeling good overall. ) and Medication Refill  .    HPI:   History of Present Illness  The patient is a 64-year-old female who presents today for a follow-up visit regarding her acid reflux, esophageal spasms, and health maintenance. She has been diagnosed with Bright's disease.    Acid Reflux  - She takes Nexium 40 mg twice daily as prescribed by her gastroenterologist in [place], [place].  - She continues to take Nexium 40 mg twice daily, one dose in the morning and another in the evening.    Esophageal Spasms  - Gastroenterology notes indicate that Cardizem 60 mg four times a day was initially prescribed for esophageal spasms.  - She is currently taking diltiazem once daily, a change from the previous four times daily regimen.  - This adjustment was made due to missed doses, which led to suboptimal control of her symptoms.  - She is seeking a 90-day supply of this medication.    Health Maintenance  - She reports feeling well overall.  - She is also on rosuvastatin 10 mg for cholesterol management and diltiazem once daily.  - Her last consultation with her gastroenterologist was approximately six months ago, during which her medication was adjusted to a once-daily regimen.  - She has received her influenza vaccine and is due for a mammogram.  - She is also due for a Pap smear and is open to scheduling it at her convenience.    Sciatica  - She is experiencing sciatica in both legs when walking on treadmill but is managing it with weight loss efforts.    Supplemental information: She has not consumed any food or drink this morning in preparation for lab work.    SOCIAL

## 2025-07-04 LAB
BACTERIA UR CULT: ABNORMAL
BACTERIA UR CULT: ABNORMAL
ORGANISM: ABNORMAL

## 2025-07-07 ENCOUNTER — RESULTS FOLLOW-UP (OUTPATIENT)
Age: 65
End: 2025-07-07

## 2025-07-07 DIAGNOSIS — N39.0 E-COLI UTI: Primary | ICD-10-CM

## 2025-07-07 DIAGNOSIS — B96.20 E-COLI UTI: Primary | ICD-10-CM

## 2025-07-07 RX ORDER — CIPROFLOXACIN 250 MG/1
250 TABLET, FILM COATED ORAL 2 TIMES DAILY
Qty: 14 TABLET | Refills: 0 | Status: SHIPPED | OUTPATIENT
Start: 2025-07-07 | End: 2025-07-14